# Patient Record
Sex: FEMALE | Race: BLACK OR AFRICAN AMERICAN
[De-identification: names, ages, dates, MRNs, and addresses within clinical notes are randomized per-mention and may not be internally consistent; named-entity substitution may affect disease eponyms.]

---

## 2018-11-06 ENCOUNTER — HOSPITAL ENCOUNTER (INPATIENT)
Dept: HOSPITAL 61 - ER | Age: 53
LOS: 3 days | Discharge: HOME | DRG: 312 | End: 2018-11-09
Attending: INTERNAL MEDICINE | Admitting: INTERNAL MEDICINE
Payer: SELF-PAY

## 2018-11-06 VITALS — BODY MASS INDEX: 37.37 KG/M2 | HEIGHT: 67 IN | WEIGHT: 238.12 LBS

## 2018-11-06 DIAGNOSIS — J45.909: ICD-10-CM

## 2018-11-06 DIAGNOSIS — I10: ICD-10-CM

## 2018-11-06 DIAGNOSIS — S91.331A: ICD-10-CM
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
## 2018-11-06 DIAGNOSIS — F17.210: ICD-10-CM
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
## 2018-11-06 DIAGNOSIS — Z88.8: ICD-10-CM
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
## 2018-11-06 DIAGNOSIS — Z90.710: ICD-10-CM

## 2018-11-06 DIAGNOSIS — E66.01: ICD-10-CM

## 2018-11-06 DIAGNOSIS — R55: Primary | ICD-10-CM

## 2018-11-06 DIAGNOSIS — Z82.49: ICD-10-CM
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
## 2018-11-06 PROCEDURE — 82553 CREATINE MB FRACTION: CPT

## 2018-11-06 PROCEDURE — 70450 CT HEAD/BRAIN W/O DYE: CPT

## 2018-11-06 PROCEDURE — 81001 URINALYSIS AUTO W/SCOPE: CPT

## 2018-11-06 PROCEDURE — 83735 ASSAY OF MAGNESIUM: CPT

## 2018-11-06 PROCEDURE — 72125 CT NECK SPINE W/O DYE: CPT

## 2018-11-06 PROCEDURE — 96360 HYDRATION IV INFUSION INIT: CPT

## 2018-11-06 PROCEDURE — 80307 DRUG TEST PRSMV CHEM ANLYZR: CPT

## 2018-11-06 PROCEDURE — 93880 EXTRACRANIAL BILAT STUDY: CPT

## 2018-11-06 PROCEDURE — 84484 ASSAY OF TROPONIN QUANT: CPT

## 2018-11-06 PROCEDURE — 93005 ELECTROCARDIOGRAM TRACING: CPT

## 2018-11-06 PROCEDURE — 71045 X-RAY EXAM CHEST 1 VIEW: CPT

## 2018-11-06 PROCEDURE — 36415 COLL VENOUS BLD VENIPUNCTURE: CPT

## 2018-11-06 PROCEDURE — 95816 EEG AWAKE AND DROWSY: CPT

## 2018-11-06 PROCEDURE — 80053 COMPREHEN METABOLIC PANEL: CPT

## 2018-11-06 PROCEDURE — 84443 ASSAY THYROID STIM HORMONE: CPT

## 2018-11-06 PROCEDURE — 85025 COMPLETE CBC W/AUTO DIFF WBC: CPT

## 2018-11-06 NOTE — PHYS DOC
Past Medical History


Past Medical History:  Asthma, Hypertension


Past Surgical History:  Hysterectomy


Additional Past Surgical Histo:  bladder surgery


Smokin Pack Per Day


Alcohol Use:  None


Drug Use:  None





Adult General


Chief Complaint


Chief Complaint:  MECHANICAL FALL





HPI


HPI





Patient is a 53  year old female presenting to the ED due to fall via EMS. She 

states that she was on a bus going to the [employer] but fell asleep due to many 

stress factors in her life, then fell off her seat, hitting her head and losing 

consciousness briefly in the process. The  called the ambulance. She 

states that she has a headache and some neck pain. She is from [place] and is in 

[place] trying to work to make money for her wedding. She stays at shelter homes and 

cousin's house. Her speech is pressured and tangential. When asked about her 

mood, she says she feels "betrayed" by her fiance. She denies suicidal and 

homicidal ideations when asked multiple times. She denies chest pain, SOB, 

abdominal pain, leg swelling.





Review of Systems


Review of Systems





Constitutional: Denies fever or chills []


Eyes: Denies change in visual acuity, redness, or eye pain []


HENT: Denies nasal congestion or sore throat []


Respiratory: Denies cough or shortness of breath []


Cardiovascular: Denies chest pain


GI: Denies abdominal pain, nausea, vomiting, or diarrhea []


: Denies dysuria or hematuria []


Musculoskeletal: Reports neck pain. Denies back pain or joint pain []


Integument: Denies rash or skin lesions []


Neurologic: Reports headache; denies focal weakness or sensory changes []





Complete systems were reviewed and found to be within normal limits, except as 

documented in this note.





Current Medications


Current Medications





Current Medications








 Medications


  (Trade)  Dose


 Ordered  Sig/Taj  Start Time


 Stop Time Status Last Admin


Dose Admin


 


 Aspirin


  (Shai Aspirin)  325 mg  1X  ONCE  18 01:15


 18 01:16 DC 18 01:27


325 MG


 


 Ondansetron HCl


  (Zofran)  4 mg  PRN Q8HRS  PRN  18 01:15


 18 01:14   





 


 Sodium Chloride  1,000 ml @ 


 1,000 mls/hr  1X  ONCE  18 23:00


 18 23:59 DC 18 00:46


1,000 MLS/HR











Allergies


Allergies





Allergies








Coded Allergies Type Severity Reaction Last Updated Verified


 


  codeine Allergy Mild  18 Yes











Physical Exam


Physical Exam





Constitutional: Well developed, well nourished, no acute distress, somnolent, 

non-toxic appearance. []


HENT: Normocephalic, atraumatic, bilateral external ears normal, oropharynx 

moist


Eyes: PERRL, EOMI, conjunctiva normal, no discharge. [] 


Neck: Normal range of motion, mild tenderness, supple, no stridor. [] 


Cardiovascular: Heart rate regular rhythm, no murmur []


Lungs & Thorax:  Bilateral breath sounds clear to auscultation []


Abdomen: Soft, no tenderness


Skin: Warm, dry, no erythema, no rash. [] 


Back: No tenderness, no CVA tenderness. [] 


Extremities: No tenderness, ROM intact, no edema. [] 


Neurologic: Alert and oriented X 3, normal motor function, normal sensory 

function, no focal deficits noted. []


Psychologic: Affect normal, judgement normal, mood normal. []





Current Patient Data


Vital Signs





 Vital Signs








  Date Time  Temp Pulse Resp B/P (MAP) Pulse Ox O2 Delivery O2 Flow Rate FiO2


 


18 00:55  80 16  95   


 


18 22:19 98.1   169/90 (116)  Room Air  





 98.1       








Lab Values





 Laboratory Tests








Test


 18


23:57 18


00:20


 


Urine Collection Type Unknown   


 


Urine Color Yellow   


 


Urine Clarity Clear   


 


Urine pH 6.0   


 


Urine Specific Gravity 1.025   


 


Urine Protein


 Negative mg/dL


(NEG-TRACE) 





 


Urine Glucose (UA)


 Negative mg/dL


(NEG) 





 


Urine Ketones (Stick)


 Negative mg/dL


(NEG) 





 


Urine Blood


 Negative (NEG)


 





 


Urine Nitrite


 Negative (NEG)


 





 


Urine Bilirubin


 Negative (NEG)


 





 


Urine Urobilinogen Dipstick


 1.0 mg/dL (0.2


mg/dL) 





 


Urine Leukocyte Esterase


 Negative (NEG)


 





 


Urine RBC 0 /HPF (0-2)   


 


Urine WBC


 Occ /HPF (0-4)


 





 


Urine Squamous Epithelial


Cells Few /LPF  


 





 


Urine Bacteria


 0 /HPF (0-FEW)


 





 


Urine Mucus Mod /LPF   


 


Urine Opiates Screen Neg (NEG)   


 


Urine Methadone Screen Neg (NEG)   


 


Urine Barbiturates Neg (NEG)   


 


Urine Phencyclidine Screen Neg (NEG)   


 


Urine


Amphetamine/Methamphetamine Neg (NEG)  


 





 


Urine Benzodiazepines Screen Neg (NEG)   


 


Urine Cocaine Screen Neg (NEG)   


 


Urine Cannabinoids Screen Neg (NEG)   


 


Urine Ethyl Alcohol Neg (NEG)   


 


White Blood Count


 


 6.9 x10^3/uL


(4.0-11.0)


 


Red Blood Count


 


 4.11 x10^6/uL


(3.50-5.40)


 


Hemoglobin


 


 12.5 g/dL


(12.0-15.5)


 


Hematocrit


 


 37.4 %


(36.0-47.0)


 


Mean Corpuscular Volume


 


 91 fL ()





 


Mean Corpuscular Hemoglobin  30 pg (25-35)  


 


Mean Corpuscular Hemoglobin


Concent 


 33 g/dL


(31-37)


 


Red Cell Distribution Width


 


 14.6 %


(11.5-14.5)  H


 


Platelet Count


 


 257 x10^3/uL


(140-400)


 


Neutrophils (%) (Auto)  56 % (31-73)  


 


Lymphocytes (%) (Auto)  28 % (24-48)  


 


Monocytes (%) (Auto)  8 % (0-9)  


 


Eosinophils (%) (Auto)  8 % (0-3)  H


 


Basophils (%) (Auto)  1 % (0-3)  


 


Neutrophils # (Auto)


 


 3.9 x10^3uL


(1.8-7.7)


 


Lymphocytes # (Auto)


 


 1.9 x10^3/uL


(1.0-4.8)


 


Monocytes # (Auto)


 


 0.5 x10^3/uL


(0.0-1.1)


 


Eosinophils # (Auto)


 


 0.5 x10^3/uL


(0.0-0.7)


 


Basophils # (Auto)


 


 0.0 x10^3/uL


(0.0-0.2)


 


Sodium Level


 


 147 mmol/L


(136-145)  H


 


Potassium Level


 


 3.9 mmol/L


(3.5-5.1)


 


Chloride Level


 


 109 mmol/L


()  H


 


Carbon Dioxide Level


 


 31 mmol/L


(21-32)


 


Anion Gap  7 (6-14)  


 


Blood Urea Nitrogen


 


 17 mg/dL


(7-20)


 


Creatinine


 


 1.0 mg/dL


(0.6-1.0)


 


Estimated GFR


(Cockcroft-Gault) 


 70.5  





 


BUN/Creatinine Ratio  17 (6-20)  


 


Glucose Level


 


 90 mg/dL


(70-99)


 


Calcium Level


 


 9.7 mg/dL


(8.5-10.1)


 


Magnesium Level


 


 2.1 mg/dL


(1.8-2.4)


 


Total Bilirubin


 


 0.3 mg/dL


(0.2-1.0)


 


Aspartate Amino Transferase


(AST) 


 20 U/L (15-37)





 


Alanine Aminotransferase (ALT)


 


 17 U/L (14-59)





 


Alkaline Phosphatase


 


 78 U/L


()


 


Creatine Kinase


 


 193 U/L


()  H


 


Creatine Kinase MB (Mass)


 


 2.5 ng/mL


(0.0-3.6)


 


Creatine Kinase MB Relative


Index 


 1.3 % (0-4)  





 


Troponin I Quantitative


 


 < 0.017 ng/mL


(0.000-0.055)


 


Total Protein


 


 7.6 g/dL


(6.4-8.2)


 


Albumin


 


 3.3 g/dL


(3.4-5.0)  L


 


Albumin/Globulin Ratio


 


 0.8 (1.0-1.7)


L


 


Ethyl Alcohol Level


 


 < 10 mg/dL


(0-10)





 Laboratory Tests


18 00:20








 Laboratory Tests


18 00:20











EKG


EKG


@0050 NSR at 67bpm, NO ST elevation





Radiology/Procedures


Radiology/Procedures


PROCEDURE: CT HEAD AND CERVICAL SPINE WO





PQRS Compliance Statement:


 


One or more of the following individualized dose reduction techniques were


utilized for this examination:  


1. Automated exposure control  


2. Adjustment of the mA and/or kV according to patient size  


3. Use of iterative reconstruction technique


 


 


CT HEAD AND CERVICAL SPINE WITHOUT CONTRAST


 


History: fall, head and neck pain, no priors


 


Comparison: None.


 


Procedure: Axial images are obtained of the head from the skull base 


through the vertex without IV contrast. Noncontrast helical CT of the 


cervical spine was performed.  Axial, sagittal, and coronal 


reconstructions were obtained. 


 


Findings: 


The ventricles and sulci are normal for the patient's age. 


No mass-effect, midline shift, hemorrhage or obvious acute infarction is 


identified.  Basilar cisterns are patent.  


 


Bone windows demonstrate no significant calvarial abnormality. 


Mild mucosal thickening right maxillary left sphenoid sinuses. No 


air-fluid level. Mastoid air cells are well aerated. 


 


There is no evidence of acute fracture or acute malalignment of the 


cervical spine. 


 


The vertebral body height and alignment are maintained. No significant 


disc space narrowing. There is degenerative endplate spurring. No perched 


or jumped facets. Craniovertebral junction is intact. Uncinate process 


hypertrophy.


 


Visualized soft tissues of the neck demonstrate no significant 


abnormalities. Mild opacity in the posterior left lung apex.


 


IMPRESSION:


1.  No acute intracranial abnormality.  


2.  No acute fracture of the cervical spine.


 


Electronically signed by: Luís Pardo MD (2018 12:15 AM) [facility]





Course & Med Decision Making


Course & Med Decision Making


54 yo female presenting to the ED with report of syncopal episode.  Patient 

neurologically intact.  Denies chest pain.  EKG stable.  Labs obtained and 

posted to chart.  Initial troponin WNL.  CXR stable. EKG stabe.  CT head/

cervical spine without acute process. Patient initially with pressured speech 

and subsequently became somnolent but arousable to voice. Given situation 

patient requiring admission for further evaluation and treatment. Discussed 

with Dr. Dougherty (hospitalist) who is in agreement with admission. Discussed 

findings and plan with patient, who acknowledges understanding and agreement.





Dragon Disclaimer


Dragon Disclaimer


This electronic medical record was generated, in whole or in part, using a 

voice recognition dictation system.





Departure


Departure


Impression:  


 Primary Impression:  


 Syncope


Disposition:  09 ADMITTED AS INPATIENT


Admitting Physician:  Ganga Maciel


Condition:  STABLE





Problem Qualifiers








 Primary Impression:  


 Syncope


 Syncope type:  unspecified  Qualified Codes:  R55 - Syncope and collapse








SHAI YARBROUGH DO 2018 23:28

## 2018-11-07 VITALS — DIASTOLIC BLOOD PRESSURE: 96 MMHG | SYSTOLIC BLOOD PRESSURE: 183 MMHG

## 2018-11-07 VITALS — SYSTOLIC BLOOD PRESSURE: 149 MMHG | DIASTOLIC BLOOD PRESSURE: 92 MMHG

## 2018-11-07 VITALS — SYSTOLIC BLOOD PRESSURE: 185 MMHG | DIASTOLIC BLOOD PRESSURE: 98 MMHG

## 2018-11-07 VITALS — DIASTOLIC BLOOD PRESSURE: 94 MMHG | SYSTOLIC BLOOD PRESSURE: 157 MMHG

## 2018-11-07 VITALS — DIASTOLIC BLOOD PRESSURE: 87 MMHG | SYSTOLIC BLOOD PRESSURE: 153 MMHG

## 2018-11-07 VITALS — SYSTOLIC BLOOD PRESSURE: 170 MMHG | DIASTOLIC BLOOD PRESSURE: 98 MMHG

## 2018-11-07 LAB
ALBUMIN SERPL-MCNC: 3.3 G/DL (ref 3.4–5)
ALBUMIN/GLOB SERPL: 0.8 {RATIO} (ref 1–1.7)
ALP SERPL-CCNC: 78 U/L (ref 46–116)
ALT SERPL-CCNC: 17 U/L (ref 14–59)
AMPHETAMINE/METHAMPHETAMINE: (no result)
ANION GAP SERPL CALC-SCNC: 7 MMOL/L (ref 6–14)
APTT PPP: YELLOW S
AST SERPL-CCNC: 20 U/L (ref 15–37)
BACTERIA #/AREA URNS HPF: 0 /HPF
BARBITURATES UR-MCNC: (no result) UG/ML
BASOPHILS # BLD AUTO: 0 X10^3/UL (ref 0–0.2)
BASOPHILS NFR BLD: 1 % (ref 0–3)
BENZODIAZ UR-MCNC: (no result) UG/L
BILIRUB SERPL-MCNC: 0.3 MG/DL (ref 0.2–1)
BILIRUB UR QL STRIP: NEGATIVE
BUN SERPL-MCNC: 17 MG/DL (ref 7–20)
BUN/CREAT SERPL: 17 (ref 6–20)
CALCIUM SERPL-MCNC: 9.7 MG/DL (ref 8.5–10.1)
CANNABINOIDS UR-MCNC: (no result) UG/L
CHLORIDE SERPL-SCNC: 109 MMOL/L (ref 98–107)
CK SERPL-CCNC: 193 U/L (ref 26–192)
CO2 SERPL-SCNC: 31 MMOL/L (ref 21–32)
COCAINE UR-MCNC: (no result) NG/ML
CREAT SERPL-MCNC: 1 MG/DL (ref 0.6–1)
EOSINOPHIL NFR BLD: 0.5 X10^3/UL (ref 0–0.7)
EOSINOPHIL NFR BLD: 8 % (ref 0–3)
ERYTHROCYTE [DISTWIDTH] IN BLOOD BY AUTOMATED COUNT: 14.6 % (ref 11.5–14.5)
FIBRINOGEN PPP-MCNC: CLEAR MG/DL
GFR SERPLBLD BASED ON 1.73 SQ M-ARVRAT: 70.5 ML/MIN
GLOBULIN SER-MCNC: 4.3 G/DL (ref 2.2–3.8)
GLUCOSE SERPL-MCNC: 90 MG/DL (ref 70–99)
HCT VFR BLD CALC: 37.4 % (ref 36–47)
HGB BLD-MCNC: 12.5 G/DL (ref 12–15.5)
LYMPHOCYTES # BLD: 1.9 X10^3/UL (ref 1–4.8)
LYMPHOCYTES NFR BLD AUTO: 28 % (ref 24–48)
MAGNESIUM SERPL-MCNC: 2.1 MG/DL (ref 1.8–2.4)
MCH RBC QN AUTO: 30 PG (ref 25–35)
MCHC RBC AUTO-ENTMCNC: 33 G/DL (ref 31–37)
MCV RBC AUTO: 91 FL (ref 79–100)
METHADONE SERPL-MCNC: (no result) NG/ML
MONO #: 0.5 X10^3/UL (ref 0–1.1)
MONOCYTES NFR BLD: 8 % (ref 0–9)
NEUT #: 3.9 X10^3UL (ref 1.8–7.7)
NEUTROPHILS NFR BLD AUTO: 56 % (ref 31–73)
NITRITE UR QL STRIP: NEGATIVE
OPIATES UR-MCNC: (no result) NG/ML
PCP SERPL-MCNC: (no result) MG/DL
PH UR STRIP: 6 [PH]
PLATELET # BLD AUTO: 257 X10^3/UL (ref 140–400)
POTASSIUM SERPL-SCNC: 3.9 MMOL/L (ref 3.5–5.1)
PROT SERPL-MCNC: 7.6 G/DL (ref 6.4–8.2)
PROT UR STRIP-MCNC: NEGATIVE MG/DL
RBC # BLD AUTO: 4.11 X10^6/UL (ref 3.5–5.4)
RBC #/AREA URNS HPF: 0 /HPF (ref 0–2)
SODIUM SERPL-SCNC: 147 MMOL/L (ref 136–145)
SQUAMOUS #/AREA URNS LPF: (no result) /LPF
UROBILINOGEN UR-MCNC: 1 MG/DL
WBC # BLD AUTO: 6.9 X10^3/UL (ref 4–11)
WBC #/AREA URNS HPF: (no result) /HPF (ref 0–4)

## 2018-11-07 RX ADMIN — BACITRACIN SCH MLS/HR: 5000 INJECTION, POWDER, FOR SOLUTION INTRAMUSCULAR at 15:55

## 2018-11-07 RX ADMIN — ENOXAPARIN SODIUM SCH MG: 40 INJECTION SUBCUTANEOUS at 15:56

## 2018-11-07 RX ADMIN — LISINOPRIL SCH MG: 20 TABLET ORAL at 15:55

## 2018-11-07 NOTE — RAD
CHEST AP ONLY

 

Clinical Indication: shortness of breath

 

Comparison:  None.

 

Findings: 

Cardiac size upper limits of normal. Mild pulmonary vascular congestion 

and cephalization. There is no pneumothorax. No pleural effusion is 

appreciated. No acute bone abnormality.

 

IMPRESSION: 

Cardiac size upper limits of normal. Mild pulmonary vascular congestion. 

Correlate for mild CHF or volume overload.

 

 

Electronically signed by: Luís Pardo MD (11/7/2018 5:47 AM) [facility]

## 2018-11-07 NOTE — PDOC2
NEUROLOGY CONSULT


Date of Admission


Date of Admission


DATE: 11/7/18 


TIME: 18:10





Reason for Consult


Reason for Consult:


IMPRESSION:


Syncopal spell.


Seizure evaluation.


Fall.


HTN.


Emotional stress.


Smoking.


Obesity.





RECOMMENDATIONS/PLAN:


EEG.


Lab: see orders.


Treat medical diseases.


Weight reduction.





HISTORY OF THE PRESENT ILLNESS:


This is a 53 -year-old AA female patient was brought to the ER of [facility] by EMS. 

The patient stated that she was on a bus going to the [facility] but fell asleep 

then fell off her seat, hitting her head. The  called ambulance so 

she was brought here. She stays at shelter homes and cousin's house. When asked 

about her mood, she says she feels "betrayed" by her fiance. She denies 

suicidal and homicidal ideations. She stated she had a lot of emotional stress.





Past Medical History


Asthma, Hypertension, a seizure 15 yrs ago when pregnant


Pulmonary:  Bronchitis





Past Surgical History


Hysterectomy, bladder surgery





Family History


HLD, Hypertension. 





Allergies


Coded Allergies:  


codeine (Verified  Allergy, Mild, 11/6/18)





MEDICATIONS:


Refer to [facility]





SOCIAL HISTORY: 


Lives with her cousin. alone. 


Denies illicit drug use. 


She smokes 1  pack of cigarettes a day for years.  


She drinks occasionally.





REVIEW OF SYSTEMS:


Constitutional: Obesity.


Head: No traumatic brain or head injury.


Skin: No edema, or rash.


Ear: No infection.


Eyes: No vision loss or color blindness.


Nose: No bleeding or purulent discharges.


Hearing: No hearing decrease.


Neck: No injury.


Breast: No history of cancer, masses,or discharges.


Cardiac: HTN.


Pulmonary: Asthma.


GI: No GI ulcer, GI bleeding.


Urinary/genital: UTI.


Endocrinologic: Obesity.


Skeletomuscular: No muscular atrophy, deformity.


Neurological: see  HP.


Psychiatric: Denies drug use/abuse.


Otherwise, not pertinant14-point review of systems.





PHYSICAL EXAMINATION:   


General appearance is in no obvious acute distress.  


HEENT:  Normocephalic and nontraumatic.  Eyes, nose, ears, and throat are 

unremarkable.  


Neck is supple. No lymphadenopathy. No bruits are heard over the carotid 

artery.  No crepitus. 


Cardiovascular:  S1, S2, regular rate and rhythm.  


Pulmonary:  Clear to auscultation bilaterally. 


Abdomen:  Bowel sounds are positive.  Abdomen is soft, nontender, and 

nondistended.    


Extremities:  No rash, lesions, or edema.  


No restriction of range of motion





NEUROLOGICAL  EXAMINATION:


Alert 


Oriented to time, place and person.


PERRL.


EOMI.


CN: no focal findings.


Muscle tone: within normal.


Muscle strength: 5


DTR: 2


Plantar reflex: Flexor response bilaterally 


Gait: not examined in bed.


Sensory exam: no abnormal findings.


No cerebellar signs elicited.


F-T-N test accurate.





Current Medications


Current Medications





Current Medications


Sodium Chloride 1,000 ml @  1,000 mls/hr 1X  ONCE IV  Last administered on 11/7/ 18at 00:46;  Start 11/6/18 at 23:00;  Stop 11/6/18 at 23:59;  Status DC


Aspirin (Shai Aspirin) 325 mg 1X  ONCE PO  Last administered on 11/7/18at 01:27

;  Start 11/7/18 at 01:15;  Stop 11/7/18 at 01:16;  Status DC


Ondansetron HCl (Zofran) 4 mg PRN Q8HRS  PRN IV NAUSEA/VOMITING;  Start 11/7/18 

at 01:15;  Stop 11/8/18 at 01:14


Lisinopril (Prinivil) 20 mg DAILY PO  Last administered on 11/7/18at 15:55;  

Start 11/7/18 at 14:00


Enoxaparin Sodium (Lovenox 40mg Syringe) 40 mg Q24H SQ  Last administered on 11/ 7/18at 15:56;  Start 11/7/18 at 14:00


Sodium Chloride 1,000 ml @  75 mls/hr [id number] IV  Last administered on 11/7/ 18at 15:55;  Start 11/7/18 at 13:45





Active Scripts


Active


Reported


Lisinopril 20 Mg Tablet 1 Tab PO DAILY





Allergies


Allergies:





Allergies








Coded Allergies Type Severity Reaction Last Updated Verified


 


  codeine Allergy Mild  11/6/18 Yes











ROS


Review of System


The patient denies any associated fevers, chills, headache, ear pain, rhinorrhea

, sore throat, stiff neck, productive cough, chest pain, shortness of breath, 

back or flank pain, abdominal pain, nausea, vomiting, diarrhea, constipation, 

dysuria, rash, numbness, weakness, tingling, incontinence, difficulty  

ambulating, or diaphoresis.





Physical Exam


Physical Exam


General: Well developed, well nourished, no acute distress, well appearing


HEENT:  Pupils equally round and reactive to light, EOMI, no discharge, normal 

conjunctiva


Neck:  Supple, no nuchal rigidity, no JVD, trachea midline, no tenderness


Cardiac:  RRR, no murmurs, no gallops, no rubs


Chest/Lungs:  CTAB, no wheeze, no rhonchi, no crackles


Abdomen: soft, non-distended, no guarding, no peritoneal signs, non-tender 


Back:  No tenderness


Extremities:  no edema, pulses intact, non-tender,capillary refill <3 sec 

bilateral upper and lower extremities,


Neuro:  Alert and oriented x 4, no focal deficits, normal speech





Vitals


Vitals:





Vital Signs








  Date Time  Temp Pulse Resp B/P (MAP) Pulse Ox O2 Delivery O2 Flow Rate FiO2


 


11/7/18 15:55  79  183/96    


 


11/7/18 15:00 97.7  20  95 Room Air  





 97.7       











Labs


Labs





Laboratory Tests








Test


 11/6/18


23:57 11/7/18


00:20 11/7/18


04:00 11/7/18


07:25


 


Urine Collection Type Unknown    


 


Urine Color Yellow    


 


Urine Clarity Clear    


 


Urine pH 6.0    


 


Urine Specific Gravity 1.025    


 


Urine Protein


 Negative mg/dL


(NEG-TRACE) 


 


 





 


Urine Glucose (UA)


 Negative mg/dL


(NEG) 


 


 





 


Urine Ketones (Stick)


 Negative mg/dL


(NEG) 


 


 





 


Urine Blood Negative (NEG)    


 


Urine Nitrite Negative (NEG)    


 


Urine Bilirubin Negative (NEG)    


 


Urine Urobilinogen Dipstick


 1.0 mg/dL (0.2


mg/dL) 


 


 





 


Urine Leukocyte Esterase Negative (NEG)    


 


Urine RBC 0 /HPF (0-2)    


 


Urine WBC Occ /HPF (0-4)    


 


Urine Squamous Epithelial


Cells Few /LPF 


 


 


 





 


Urine Bacteria 0 /HPF (0-FEW)    


 


Urine Mucus Mod /LPF    


 


Urine Opiates Screen Neg (NEG)    


 


Urine Methadone Screen Neg (NEG)    


 


Urine Barbiturates Neg (NEG)    


 


Urine Phencyclidine Screen Neg (NEG)    


 


Urine


Amphetamine/Methamphetamine Neg (NEG) 


 


 


 





 


Urine Benzodiazepines Screen Neg (NEG)    


 


Urine Cocaine Screen Neg (NEG)    


 


Urine Cannabinoids Screen Neg (NEG)    


 


Urine Ethyl Alcohol Neg (NEG)    


 


White Blood Count


 


 6.9 x10^3/uL


(4.0-11.0) 


 





 


Red Blood Count


 


 4.11 x10^6/uL


(3.50-5.40) 


 





 


Hemoglobin


 


 12.5 g/dL


(12.0-15.5) 


 





 


Hematocrit


 


 37.4 %


(36.0-47.0) 


 





 


Mean Corpuscular Volume  91 fL ()   


 


Mean Corpuscular Hemoglobin  30 pg (25-35)   


 


Mean Corpuscular Hemoglobin


Concent 


 33 g/dL


(31-37) 


 





 


Red Cell Distribution Width


 


 14.6 %


(11.5-14.5) 


 





 


Platelet Count


 


 257 x10^3/uL


(140-400) 


 





 


Neutrophils (%) (Auto)  56 % (31-73)   


 


Lymphocytes (%) (Auto)  28 % (24-48)   


 


Monocytes (%) (Auto)  8 % (0-9)   


 


Eosinophils (%) (Auto)  8 % (0-3)   


 


Basophils (%) (Auto)  1 % (0-3)   


 


Neutrophils # (Auto)


 


 3.9 x10^3uL


(1.8-7.7) 


 





 


Lymphocytes # (Auto)


 


 1.9 x10^3/uL


(1.0-4.8) 


 





 


Monocytes # (Auto)


 


 0.5 x10^3/uL


(0.0-1.1) 


 





 


Eosinophils # (Auto)


 


 0.5 x10^3/uL


(0.0-0.7) 


 





 


Basophils # (Auto)


 


 0.0 x10^3/uL


(0.0-0.2) 


 





 


Sodium Level


 


 147 mmol/L


(136-145) 


 





 


Potassium Level


 


 3.9 mmol/L


(3.5-5.1) 


 





 


Chloride Level


 


 109 mmol/L


() 


 





 


Carbon Dioxide Level


 


 31 mmol/L


(21-32) 


 





 


Anion Gap  7 (6-14)   


 


Blood Urea Nitrogen


 


 17 mg/dL


(7-20) 


 





 


Creatinine


 


 1.0 mg/dL


(0.6-1.0) 


 





 


Estimated GFR


(Cockcroft-Gault) 


 70.5 


 


 





 


BUN/Creatinine Ratio  17 (6-20)   


 


Glucose Level


 


 90 mg/dL


(70-99) 


 





 


Calcium Level


 


 9.7 mg/dL


(8.5-10.1) 


 





 


Magnesium Level


 


 2.1 mg/dL


(1.8-2.4) 


 





 


Total Bilirubin


 


 0.3 mg/dL


(0.2-1.0) 


 





 


Aspartate Amino Transf


(AST/SGOT) 


 20 U/L (15-37) 


 


 





 


Alanine Aminotransferase


(ALT/SGPT) 


 17 U/L (14-59) 


 


 





 


Alkaline Phosphatase


 


 78 U/L


() 


 





 


Creatine Kinase


 


 193 U/L


() 


 





 


Creatine Kinase MB (Mass)


 


 2.5 ng/mL


(0.0-3.6) 


 





 


Creatine Kinase MB Relative


Index 


 1.3 % (0-4) 


 


 





 


Troponin I Quantitative


 


 < 0.017 ng/mL


(0.000-0.055) < 0.017 ng/mL


(0.000-0.055) < 0.017 ng/mL


(0.000-0.055)


 


Total Protein


 


 7.6 g/dL


(6.4-8.2) 


 





 


Albumin


 


 3.3 g/dL


(3.4-5.0) 


 





 


Albumin/Globulin Ratio  0.8 (1.0-1.7)   


 


Ethyl Alcohol Level


 


 < 10 mg/dL


(0-10) 


 





 


Thyroid Stimulating Hormone


(TSH) 


 


 


 1.179 uIU/mL


(0.358-3.74)








Laboratory Tests








Test


 11/6/18


23:57 11/7/18


00:20 11/7/18


04:00 11/7/18


07:25


 


Urine Collection Type Unknown    


 


Urine Color Yellow    


 


Urine Clarity Clear    


 


Urine pH 6.0    


 


Urine Specific Gravity 1.025    


 


Urine Protein


 Negative mg/dL


(NEG-TRACE) 


 


 





 


Urine Glucose (UA)


 Negative mg/dL


(NEG) 


 


 





 


Urine Ketones (Stick)


 Negative mg/dL


(NEG) 


 


 





 


Urine Blood Negative (NEG)    


 


Urine Nitrite Negative (NEG)    


 


Urine Bilirubin Negative (NEG)    


 


Urine Urobilinogen Dipstick


 1.0 mg/dL (0.2


mg/dL) 


 


 





 


Urine Leukocyte Esterase Negative (NEG)    


 


Urine RBC 0 /HPF (0-2)    


 


Urine WBC Occ /HPF (0-4)    


 


Urine Squamous Epithelial


Cells Few /LPF 


 


 


 





 


Urine Bacteria 0 /HPF (0-FEW)    


 


Urine Mucus Mod /LPF    


 


Urine Opiates Screen Neg (NEG)    


 


Urine Methadone Screen Neg (NEG)    


 


Urine Barbiturates Neg (NEG)    


 


Urine Phencyclidine Screen Neg (NEG)    


 


Urine


Amphetamine/Methamphetamine Neg (NEG) 


 


 


 





 


Urine Benzodiazepines Screen Neg (NEG)    


 


Urine Cocaine Screen Neg (NEG)    


 


Urine Cannabinoids Screen Neg (NEG)    


 


Urine Ethyl Alcohol Neg (NEG)    


 


White Blood Count


 


 6.9 x10^3/uL


(4.0-11.0) 


 





 


Red Blood Count


 


 4.11 x10^6/uL


(3.50-5.40) 


 





 


Hemoglobin


 


 12.5 g/dL


(12.0-15.5) 


 





 


Hematocrit


 


 37.4 %


(36.0-47.0) 


 





 


Mean Corpuscular Volume  91 fL ()   


 


Mean Corpuscular Hemoglobin  30 pg (25-35)   


 


Mean Corpuscular Hemoglobin


Concent 


 33 g/dL


(31-37) 


 





 


Red Cell Distribution Width


 


 14.6 %


(11.5-14.5) 


 





 


Platelet Count


 


 257 x10^3/uL


(140-400) 


 





 


Neutrophils (%) (Auto)  56 % (31-73)   


 


Lymphocytes (%) (Auto)  28 % (24-48)   


 


Monocytes (%) (Auto)  8 % (0-9)   


 


Eosinophils (%) (Auto)  8 % (0-3)   


 


Basophils (%) (Auto)  1 % (0-3)   


 


Neutrophils # (Auto)


 


 3.9 x10^3uL


(1.8-7.7) 


 





 


Lymphocytes # (Auto)


 


 1.9 x10^3/uL


(1.0-4.8) 


 





 


Monocytes # (Auto)


 


 0.5 x10^3/uL


(0.0-1.1) 


 





 


Eosinophils # (Auto)


 


 0.5 x10^3/uL


(0.0-0.7) 


 





 


Basophils # (Auto)


 


 0.0 x10^3/uL


(0.0-0.2) 


 





 


Sodium Level


 


 147 mmol/L


(136-145) 


 





 


Potassium Level


 


 3.9 mmol/L


(3.5-5.1) 


 





 


Chloride Level


 


 109 mmol/L


() 


 





 


Carbon Dioxide Level


 


 31 mmol/L


(21-32) 


 





 


Anion Gap  7 (6-14)   


 


Blood Urea Nitrogen


 


 17 mg/dL


(7-20) 


 





 


Creatinine


 


 1.0 mg/dL


(0.6-1.0) 


 





 


Estimated GFR


(Cockcroft-Gault) 


 70.5 


 


 





 


BUN/Creatinine Ratio  17 (6-20)   


 


Glucose Level


 


 90 mg/dL


(70-99) 


 





 


Calcium Level


 


 9.7 mg/dL


(8.5-10.1) 


 





 


Magnesium Level


 


 2.1 mg/dL


(1.8-2.4) 


 





 


Total Bilirubin


 


 0.3 mg/dL


(0.2-1.0) 


 





 


Aspartate Amino Transf


(AST/SGOT) 


 20 U/L (15-37) 


 


 





 


Alanine Aminotransferase


(ALT/SGPT) 


 17 U/L (14-59) 


 


 





 


Alkaline Phosphatase


 


 78 U/L


() 


 





 


Creatine Kinase


 


 193 U/L


() 


 





 


Creatine Kinase MB (Mass)


 


 2.5 ng/mL


(0.0-3.6) 


 





 


Creatine Kinase MB Relative


Index 


 1.3 % (0-4) 


 


 





 


Troponin I Quantitative


 


 < 0.017 ng/mL


(0.000-0.055) < 0.017 ng/mL


(0.000-0.055) < 0.017 ng/mL


(0.000-0.055)


 


Total Protein


 


 7.6 g/dL


(6.4-8.2) 


 





 


Albumin


 


 3.3 g/dL


(3.4-5.0) 


 





 


Albumin/Globulin Ratio  0.8 (1.0-1.7)   


 


Ethyl Alcohol Level


 


 < 10 mg/dL


(0-10) 


 





 


Thyroid Stimulating Hormone


(TSH) 


 


 


 1.179 uIU/mL


(0.358-3.74)

















SOHAIL BYRNE MD Nov 7, 2018 18:21

## 2018-11-07 NOTE — PDOC1
History and Physical


Date of Admission


Date of Admission


DATE: 18 


TIME: 10:57





Identification/Chief Complaint


Chief Complaint


 seen in ER 53  year old female presented  to the ED due to fall via EMS. 





she was on a bus going to the [employer] but fell asleep due to many stress factors 

in her life, then fell off her seat, hitting her head and losing consciousness 

briefly 





 The  called the ambulance. She states that she has a headache and 

some neck pain. 





She is from [place] and is in [place] trying to work to make money for her wedding. 





She stays at shelter homes and cousin's house. Her speech is pressured and 

tangential. 





she played the violin in high school, very talented, but had behavioral issues, 

she reports





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 

agencies





Past Medical History


Past Medical History:  Asthma, Hypertension


Past Surgical History:  Hysterectomy


Additional Past Surgical Histo:  bladder surgery


Smokin Pack Per Day


Alcohol Use:  None


Drug Use:  None





had a seizure 15 yrs ago when pregnant





family hx obesity


Pulmonary:  Bronchitis





Past Surgical History


Past Surgical History:  No pertinent history





Family History


Family History:  High Cholestrol, Hypertension





Social History


Smoke:  <1 pack per day


ALCOHOL:  occassional


Drugs:  None





Current Problem List


Problem List


Problems


Medical Problems:


(1) Syncope


Status: Acute  











Current Medications


Current Medications





Current Medications


Sodium Chloride 1,000 ml @  1,000 mls/hr 1X  ONCE IV  Last administered on at 00:46;  Start 18 at 23:00;  Stop 18 at 23:59;  Status DC


Aspirin (Shai Aspirin) 325 mg 1X  ONCE PO  Last administered on 18at 01:27

;  Start 18 at 01:15;  Stop 18 at 01:16;  Status DC


Ondansetron HCl (Zofran) 4 mg PRN Q8HRS  PRN IV NAUSEA/VOMITING;  Start 18 

at 01:15;  Stop 18 at 01:14





Active Scripts


Active


Reported


Lisinopril 20 Mg Tablet 1 Tab PO DAILY





Allergies


Allergies:  


Coded Allergies:  


     codeine (Verified  Allergy, Mild, 18)





ROS


Review of System





Review of Systems


Review of Systems





Constitutional: Denies fever or chills []


Eyes: Denies change in visual acuity, redness, or eye pain []


HENT: Denies nasal congestion or sore throat []


Respiratory: Denies cough or shortness of breath []


Cardiovascular: Denies chest pain


GI: Denies abdominal pain, nausea, vomiting, or diarrhea []


: Denies dysuria or hematuria []


Musculoskeletal: Reports neck pain. Denies back pain or joint pain []


Integument: Denies rash or skin lesions []


Neurologic: Reports headache; denies focal weakness or sensory changes []





14 pt systems  were reviewed and found to be within normal limits, except as 

documented


General:  YES: Fatigue


PSYCHOLOGICAL ROS:  YES: Concentration difficultie





Physical Exam





Constitutional: Well developed, well nourished, no acute distress, somnolent, 

non-toxic appearance. []


HENT: Normocephalic, atraumatic, bilateral external ears normal, oropharynx 

moist


Eyes: PERRL, EOMI, conjunctiva normal, no discharge. [] 


Neck: Normal range of motion, mild tenderness, supple, no stridor. [] 


Cardiovascular: Heart rate regular rhythm, no murmur []


Lungs & Thorax:  Bilateral breath sounds clear to auscultation []


Abdomen: Soft, no tenderness


Skin: Warm, dry, no erythema, no rash. [] 


Back: No tenderness, no CVA tenderness. [] 


Extremities: No tenderness, ROM intact, no edema. [] 


Neurologic: Alert and oriented X 3, normal motor function, normal sensory 

function, no focal deficits noted. []


Psychologic: Affect normal, judgement normal, mood normal. []


General:  Alert, Oriented X3, Cooperative


HEENT:  EOMI


Lungs:  Clear to auscultation


Heart:  RRR, no thrills


Breasts:  Not examined


Abdomen:  Soft


Rectal Exam:  not examined


Extremities:  No cyanosis


Neuro:  Cranial nerves 3-12 NL





Vitals


Vitals





Vital Signs








  Date Time  Temp Pulse Resp B/P (MAP) Pulse Ox O2 Delivery O2 Flow Rate FiO2


 


18 08:00      Room Air  


 


18 07:00 98.1 67 20 153/87 (109) 96   





 98.1       











Labs


Labs





Laboratory Tests








Test


 18


23:57 18


00:20 18


04:00 18


07:25


 


Urine Collection Type Unknown    


 


Urine Color Yellow    


 


Urine Clarity Clear    


 


Urine pH 6.0    


 


Urine Specific Gravity 1.025    


 


Urine Protein


 Negative mg/dL


(NEG-TRACE) 


 


 





 


Urine Glucose (UA)


 Negative mg/dL


(NEG) 


 


 





 


Urine Ketones (Stick)


 Negative mg/dL


(NEG) 


 


 





 


Urine Blood Negative (NEG)    


 


Urine Nitrite Negative (NEG)    


 


Urine Bilirubin Negative (NEG)    


 


Urine Urobilinogen Dipstick


 1.0 mg/dL (0.2


mg/dL) 


 


 





 


Urine Leukocyte Esterase Negative (NEG)    


 


Urine RBC 0 /HPF (0-2)    


 


Urine WBC Occ /HPF (0-4)    


 


Urine Squamous Epithelial


Cells Few /LPF 


 


 


 





 


Urine Bacteria 0 /HPF (0-FEW)    


 


Urine Mucus Mod /LPF    


 


Urine Opiates Screen Neg (NEG)    


 


Urine Methadone Screen Neg (NEG)    


 


Urine Barbiturates Neg (NEG)    


 


Urine Phencyclidine Screen Neg (NEG)    


 


Urine


Amphetamine/Methamphetamine Neg (NEG) 


 


 


 





 


Urine Benzodiazepines Screen Neg (NEG)    


 


Urine Cocaine Screen Neg (NEG)    


 


Urine Cannabinoids Screen Neg (NEG)    


 


Urine Ethyl Alcohol Neg (NEG)    


 


White Blood Count


 


 6.9 x10^3/uL


(4.0-11.0) 


 





 


Red Blood Count


 


 4.11 x10^6/uL


(3.50-5.40) 


 





 


Hemoglobin


 


 12.5 g/dL


(12.0-15.5) 


 





 


Hematocrit


 


 37.4 %


(36.0-47.0) 


 





 


Mean Corpuscular Volume  91 fL ()   


 


Mean Corpuscular Hemoglobin  30 pg (25-35)   


 


Mean Corpuscular Hemoglobin


Concent 


 33 g/dL


(31-37) 


 





 


Red Cell Distribution Width


 


 14.6 %


(11.5-14.5) 


 





 


Platelet Count


 


 257 x10^3/uL


(140-400) 


 





 


Neutrophils (%) (Auto)  56 % (31-73)   


 


Lymphocytes (%) (Auto)  28 % (24-48)   


 


Monocytes (%) (Auto)  8 % (0-9)   


 


Eosinophils (%) (Auto)  8 % (0-3)   


 


Basophils (%) (Auto)  1 % (0-3)   


 


Neutrophils # (Auto)


 


 3.9 x10^3uL


(1.8-7.7) 


 





 


Lymphocytes # (Auto)


 


 1.9 x10^3/uL


(1.0-4.8) 


 





 


Monocytes # (Auto)


 


 0.5 x10^3/uL


(0.0-1.1) 


 





 


Eosinophils # (Auto)


 


 0.5 x10^3/uL


(0.0-0.7) 


 





 


Basophils # (Auto)


 


 0.0 x10^3/uL


(0.0-0.2) 


 





 


Sodium Level


 


 147 mmol/L


(136-145) 


 





 


Potassium Level


 


 3.9 mmol/L


(3.5-5.1) 


 





 


Chloride Level


 


 109 mmol/L


() 


 





 


Carbon Dioxide Level


 


 31 mmol/L


(21-32) 


 





 


Anion Gap  7 (6-14)   


 


Blood Urea Nitrogen


 


 17 mg/dL


(7-20) 


 





 


Creatinine


 


 1.0 mg/dL


(0.6-1.0) 


 





 


Estimated GFR


(Cockcroft-Gault) 


 70.5 


 


 





 


BUN/Creatinine Ratio  17 (6-20)   


 


Glucose Level


 


 90 mg/dL


(70-99) 


 





 


Calcium Level


 


 9.7 mg/dL


(8.5-10.1) 


 





 


Magnesium Level


 


 2.1 mg/dL


(1.8-2.4) 


 





 


Total Bilirubin


 


 0.3 mg/dL


(0.2-1.0) 


 





 


Aspartate Amino Transf


(AST/SGOT) 


 20 U/L (15-37) 


 


 





 


Alanine Aminotransferase


(ALT/SGPT) 


 17 U/L (14-59) 


 


 





 


Alkaline Phosphatase


 


 78 U/L


() 


 





 


Creatine Kinase


 


 193 U/L


() 


 





 


Creatine Kinase MB (Mass)


 


 2.5 ng/mL


(0.0-3.6) 


 





 


Creatine Kinase MB Relative


Index 


 1.3 % (0-4) 


 


 





 


Troponin I Quantitative


 


 < 0.017 ng/mL


(0.000-0.055) < 0.017 ng/mL


(0.000-0.055) < 0.017 ng/mL


(0.000-0.055)


 


Total Protein


 


 7.6 g/dL


(6.4-8.2) 


 





 


Albumin


 


 3.3 g/dL


(3.4-5.0) 


 





 


Albumin/Globulin Ratio  0.8 (1.0-1.7)   


 


Ethyl Alcohol Level


 


 < 10 mg/dL


(0-10) 


 











Laboratory Tests








Test


 18


23:57 18


00:20 18


04:00 18


07:25


 


Urine Collection Type Unknown    


 


Urine Color Yellow    


 


Urine Clarity Clear    


 


Urine pH 6.0    


 


Urine Specific Gravity 1.025    


 


Urine Protein


 Negative mg/dL


(NEG-TRACE) 


 


 





 


Urine Glucose (UA)


 Negative mg/dL


(NEG) 


 


 





 


Urine Ketones (Stick)


 Negative mg/dL


(NEG) 


 


 





 


Urine Blood Negative (NEG)    


 


Urine Nitrite Negative (NEG)    


 


Urine Bilirubin Negative (NEG)    


 


Urine Urobilinogen Dipstick


 1.0 mg/dL (0.2


mg/dL) 


 


 





 


Urine Leukocyte Esterase Negative (NEG)    


 


Urine RBC 0 /HPF (0-2)    


 


Urine WBC Occ /HPF (0-4)    


 


Urine Squamous Epithelial


Cells Few /LPF 


 


 


 





 


Urine Bacteria 0 /HPF (0-FEW)    


 


Urine Mucus Mod /LPF    


 


Urine Opiates Screen Neg (NEG)    


 


Urine Methadone Screen Neg (NEG)    


 


Urine Barbiturates Neg (NEG)    


 


Urine Phencyclidine Screen Neg (NEG)    


 


Urine


Amphetamine/Methamphetamine Neg (NEG) 


 


 


 





 


Urine Benzodiazepines Screen Neg (NEG)    


 


Urine Cocaine Screen Neg (NEG)    


 


Urine Cannabinoids Screen Neg (NEG)    


 


Urine Ethyl Alcohol Neg (NEG)    


 


White Blood Count


 


 6.9 x10^3/uL


(4.0-11.0) 


 





 


Red Blood Count


 


 4.11 x10^6/uL


(3.50-5.40) 


 





 


Hemoglobin


 


 12.5 g/dL


(12.0-15.5) 


 





 


Hematocrit


 


 37.4 %


(36.0-47.0) 


 





 


Mean Corpuscular Volume  91 fL ()   


 


Mean Corpuscular Hemoglobin  30 pg (25-35)   


 


Mean Corpuscular Hemoglobin


Concent 


 33 g/dL


(31-37) 


 





 


Red Cell Distribution Width


 


 14.6 %


(11.5-14.5) 


 





 


Platelet Count


 


 257 x10^3/uL


(140-400) 


 





 


Neutrophils (%) (Auto)  56 % (31-73)   


 


Lymphocytes (%) (Auto)  28 % (24-48)   


 


Monocytes (%) (Auto)  8 % (0-9)   


 


Eosinophils (%) (Auto)  8 % (0-3)   


 


Basophils (%) (Auto)  1 % (0-3)   


 


Neutrophils # (Auto)


 


 3.9 x10^3uL


(1.8-7.7) 


 





 


Lymphocytes # (Auto)


 


 1.9 x10^3/uL


(1.0-4.8) 


 





 


Monocytes # (Auto)


 


 0.5 x10^3/uL


(0.0-1.1) 


 





 


Eosinophils # (Auto)


 


 0.5 x10^3/uL


(0.0-0.7) 


 





 


Basophils # (Auto)


 


 0.0 x10^3/uL


(0.0-0.2) 


 





 


Sodium Level


 


 147 mmol/L


(136-145) 


 





 


Potassium Level


 


 3.9 mmol/L


(3.5-5.1) 


 





 


Chloride Level


 


 109 mmol/L


() 


 





 


Carbon Dioxide Level


 


 31 mmol/L


(21-32) 


 





 


Anion Gap  7 (6-14)   


 


Blood Urea Nitrogen


 


 17 mg/dL


(7-20) 


 





 


Creatinine


 


 1.0 mg/dL


(0.6-1.0) 


 





 


Estimated GFR


(Cockcroft-Gault) 


 70.5 


 


 





 


BUN/Creatinine Ratio  17 (6-20)   


 


Glucose Level


 


 90 mg/dL


(70-99) 


 





 


Calcium Level


 


 9.7 mg/dL


(8.5-10.1) 


 





 


Magnesium Level


 


 2.1 mg/dL


(1.8-2.4) 


 





 


Total Bilirubin


 


 0.3 mg/dL


(0.2-1.0) 


 





 


Aspartate Amino Transf


(AST/SGOT) 


 20 U/L (15-37) 


 


 





 


Alanine Aminotransferase


(ALT/SGPT) 


 17 U/L (14-59) 


 


 





 


Alkaline Phosphatase


 


 78 U/L


() 


 





 


Creatine Kinase


 


 193 U/L


() 


 





 


Creatine Kinase MB (Mass)


 


 2.5 ng/mL


(0.0-3.6) 


 





 


Creatine Kinase MB Relative


Index 


 1.3 % (0-4) 


 


 





 


Troponin I Quantitative


 


 < 0.017 ng/mL


(0.000-0.055) < 0.017 ng/mL


(0.000-0.055) < 0.017 ng/mL


(0.000-0.055)


 


Total Protein


 


 7.6 g/dL


(6.4-8.2) 


 





 


Albumin


 


 3.3 g/dL


(3.4-5.0) 


 





 


Albumin/Globulin Ratio  0.8 (1.0-1.7)   


 


Ethyl Alcohol Level


 


 < 10 mg/dL


(0-10) 


 














Images


Images


PROCEDURE: CT HEAD AND CERVICAL SPINE WO





PQRS Compliance Statement:


 


One or more of the following individualized dose reduction techniques were


utilized for this examination:  


1. Automated exposure control  


2. Adjustment of the mA and/or kV according to patient size  


3. Use of iterative reconstruction technique


 


 


CT HEAD AND CERVICAL SPINE WITHOUT CONTRAST


 


History: fall, head and neck pain, no priors


 


Comparison: None.


 


Procedure: Axial images are obtained of the head from the skull base 


through the vertex without IV contrast. Noncontrast helical CT of the 


cervical spine was performed.  Axial, sagittal, and coronal 


reconstructions were obtained. 


 


Findings: 


The ventricles and sulci are normal for the patient's age. 


No mass-effect, midline shift, hemorrhage or obvious acute infarction is 


identified.  Basilar cisterns are patent.  


 


Bone windows demonstrate no significant calvarial abnormality. 


Mild mucosal thickening right maxillary left sphenoid sinuses. No 


air-fluid level. Mastoid air cells are well aerated. 


 


There is no evidence of acute fracture or acute malalignment of the 


cervical spine. 


 


The vertebral body height and alignment are maintained. No significant 


disc space narrowing. There is degenerative endplate spurring. No perched 


or jumped facets. Craniovertebral junction is intact. Uncinate process 


hypertrophy.


 


Visualized soft tissues of the neck demonstrate no significant 


abnormalities. Mild opacity in the posterior left lung apex.


 


IMPRESSION:


1.  No acute intracranial abnormality.  


2.  No acute fracture of the cervical spine.


 


Electronically signed by: Luís Pardo MD (2018 12:15 AM) [facility]





VTE Prophylaxis Ordered


VTE Prophylaxis Devices:  Yes


VTE Pharmacological Prophylaxi:  Yes





Assessment/Plan


Assessment/Plan


           Impression:  


            possible seizure, reports a seizure 15 yrs ago when pregnant


 Syncope


 mechanical fall


 tobacco abuse


 morbid obesity


 no acute intracranial abnormality.  


            No acute fracture of the cervical spine.


            hx hypertension


            mild hypernatremia, volume depleted


            possible bipolar


 


 


 plan


 


 SEIZURE PRECAUTIONS


 EEG


 iv fluids


 follow lytes


 doppler carotids


 tele


 neurochecks q 4 hrs


 neurology consult


 ct head











GHULAM MOYER MD 2018 10:57

## 2018-11-07 NOTE — RAD
PQRS Compliance Statement:

 

One or more of the following individualized dose reduction techniques were

utilized for this examination:  

1. Automated exposure control  

2. Adjustment of the mA and/or kV according to patient size  

3. Use of iterative reconstruction technique

 

 

CT HEAD AND CERVICAL SPINE WITHOUT CONTRAST

 

History: fall, head and neck pain, no priors

 

Comparison: None.

 

Procedure: Axial images are obtained of the head from the skull base 

through the vertex without IV contrast. Noncontrast helical CT of the 

cervical spine was performed.  Axial, sagittal, and coronal 

reconstructions were obtained. 

 

Findings: 

The ventricles and sulci are normal for the patient's age. 

No mass-effect, midline shift, hemorrhage or obvious acute infarction is 

identified.  Basilar cisterns are patent.  

 

Bone windows demonstrate no significant calvarial abnormality. 

Mild mucosal thickening right maxillary left sphenoid sinuses. No 

air-fluid level. Mastoid air cells are well aerated. 

 

There is no evidence of acute fracture or acute malalignment of the 

cervical spine. 

 

The vertebral body height and alignment are maintained. No significant 

disc space narrowing. There is degenerative endplate spurring. No perched 

or jumped facets. Craniovertebral junction is intact. Uncinate process 

hypertrophy.

 

Visualized soft tissues of the neck demonstrate no significant 

abnormalities. Mild opacity in the posterior left lung apex.

 

IMPRESSION:

1.  No acute intracranial abnormality.  

2.  No acute fracture of the cervical spine.

 

Electronically signed by: Luís Pardo MD (11/7/2018 12:15 AM) [facility]

## 2018-11-07 NOTE — EKG
[facility]

              [address] [place] [address]

Test Date:    2018               Test Time:    00:05:50

Pat Name:     JORDYN WILSON            Department:   

Patient ID:   [id number]           Room:         4 1

Gender:       F                        Technician:   

:          [id number]               Requested By: SHAI YARBROUGH

Order Number: [id number]           Reading MD:   Marcos Bass MD

                                 Measurements

Intervals                              Axis          

Rate:         67                       P:            54

OR:           140                      QRS:          22

QRSD:         82                       T:            37

QT:           368                                    

QTc:          391                                    

                           Interpretive Statements

SINUS ARRHYTHMIA



Electronically Signed On 2018 17:53:55 CST by Marcos Bass MD

## 2018-11-08 VITALS — DIASTOLIC BLOOD PRESSURE: 94 MMHG | SYSTOLIC BLOOD PRESSURE: 155 MMHG

## 2018-11-08 VITALS — DIASTOLIC BLOOD PRESSURE: 94 MMHG | SYSTOLIC BLOOD PRESSURE: 181 MMHG

## 2018-11-08 VITALS — SYSTOLIC BLOOD PRESSURE: 150 MMHG | DIASTOLIC BLOOD PRESSURE: 77 MMHG

## 2018-11-08 VITALS — DIASTOLIC BLOOD PRESSURE: 64 MMHG | SYSTOLIC BLOOD PRESSURE: 118 MMHG

## 2018-11-08 VITALS — SYSTOLIC BLOOD PRESSURE: 126 MMHG | DIASTOLIC BLOOD PRESSURE: 78 MMHG

## 2018-11-08 VITALS — DIASTOLIC BLOOD PRESSURE: 87 MMHG | SYSTOLIC BLOOD PRESSURE: 156 MMHG

## 2018-11-08 VITALS — DIASTOLIC BLOOD PRESSURE: 103 MMHG | SYSTOLIC BLOOD PRESSURE: 158 MMHG

## 2018-11-08 LAB
ALBUMIN SERPL-MCNC: 2.6 G/DL (ref 3.4–5)
ALBUMIN/GLOB SERPL: 0.7 {RATIO} (ref 1–1.7)
ALP SERPL-CCNC: 72 U/L (ref 46–116)
ALT SERPL-CCNC: 15 U/L (ref 14–59)
ANION GAP SERPL CALC-SCNC: 6 MMOL/L (ref 6–14)
AST SERPL-CCNC: 22 U/L (ref 15–37)
BASOPHILS # BLD AUTO: 0 X10^3/UL (ref 0–0.2)
BASOPHILS NFR BLD: 1 % (ref 0–3)
BILIRUB SERPL-MCNC: 0.1 MG/DL (ref 0.2–1)
BUN SERPL-MCNC: 14 MG/DL (ref 7–20)
BUN/CREAT SERPL: 16 (ref 6–20)
CALCIUM SERPL-MCNC: 8.8 MG/DL (ref 8.5–10.1)
CHLORIDE SERPL-SCNC: 109 MMOL/L (ref 98–107)
CO2 SERPL-SCNC: 30 MMOL/L (ref 21–32)
CREAT SERPL-MCNC: 0.9 MG/DL (ref 0.6–1)
EOSINOPHIL NFR BLD: 0.6 X10^3/UL (ref 0–0.7)
EOSINOPHIL NFR BLD: 11 % (ref 0–3)
ERYTHROCYTE [DISTWIDTH] IN BLOOD BY AUTOMATED COUNT: 14.9 % (ref 11.5–14.5)
GFR SERPLBLD BASED ON 1.73 SQ M-ARVRAT: 79.6 ML/MIN
GLOBULIN SER-MCNC: 3.6 G/DL (ref 2.2–3.8)
GLUCOSE SERPL-MCNC: 111 MG/DL (ref 70–99)
HCT VFR BLD CALC: 32.4 % (ref 36–47)
HGB BLD-MCNC: 10.7 G/DL (ref 12–15.5)
LYMPHOCYTES # BLD: 2.1 X10^3/UL (ref 1–4.8)
LYMPHOCYTES NFR BLD AUTO: 39 % (ref 24–48)
MCH RBC QN AUTO: 30 PG (ref 25–35)
MCHC RBC AUTO-ENTMCNC: 33 G/DL (ref 31–37)
MCV RBC AUTO: 92 FL (ref 79–100)
MONO #: 0.5 X10^3/UL (ref 0–1.1)
MONOCYTES NFR BLD: 9 % (ref 0–9)
NEUT #: 2.2 X10^3UL (ref 1.8–7.7)
NEUTROPHILS NFR BLD AUTO: 41 % (ref 31–73)
PLATELET # BLD AUTO: 207 X10^3/UL (ref 140–400)
POTASSIUM SERPL-SCNC: 3.9 MMOL/L (ref 3.5–5.1)
PROT SERPL-MCNC: 6.2 G/DL (ref 6.4–8.2)
RBC # BLD AUTO: 3.53 X10^6/UL (ref 3.5–5.4)
SODIUM SERPL-SCNC: 145 MMOL/L (ref 136–145)
WBC # BLD AUTO: 5.3 X10^3/UL (ref 4–11)

## 2018-11-08 RX ADMIN — ENOXAPARIN SODIUM SCH MG: 40 INJECTION SUBCUTANEOUS at 13:35

## 2018-11-08 RX ADMIN — BACITRACIN SCH MLS/HR: 5000 INJECTION, POWDER, FOR SOLUTION INTRAMUSCULAR at 16:25

## 2018-11-08 RX ADMIN — BACITRACIN SCH MLS/HR: 5000 INJECTION, POWDER, FOR SOLUTION INTRAMUSCULAR at 04:57

## 2018-11-08 RX ADMIN — CEPHALEXIN SCH MG: 250 CAPSULE ORAL at 17:07

## 2018-11-08 RX ADMIN — CEPHALEXIN SCH MG: 250 CAPSULE ORAL at 20:33

## 2018-11-08 RX ADMIN — CEPHALEXIN SCH MG: 250 CAPSULE ORAL at 13:35

## 2018-11-08 RX ADMIN — Medication SCH CAP: at 20:33

## 2018-11-08 RX ADMIN — LISINOPRIL SCH MG: 20 TABLET ORAL at 08:26

## 2018-11-08 NOTE — RAD
Carotid ultrasound, 11/7/2018:

 

HISTORY: Dizziness, syncope

 

Duplex evaluation of the carotid arteries and neck was performed including

grayscale, color-flow and spectral Doppler analysis

 

There is moderate intimal thickening in the common carotid arteries 

bilaterally with mild smooth plaquing at the bifurcations. The peak 

systolic velocity in the right internal carotid artery is 99 cm per sec 

with an end-diastolic velocity of 30 cm/s and an internal carotid to 

common carotid artery ratio of 0.8. The peak systolic velocity in the left

internal carotid artery is 123 cm/s with an end-diastolic velocity of 26 

cm/s and an internal carotid to common carotid artery ratio of 0.9. These 

Doppler findings suggest narrowing in the 0-50 percent diameter range.

 

Antegrade flow is present in both vertebral arteries in the neck.

 

IMPRESSION: Mild atherosclerotic plaquing at both carotid bifurcations 

with underlying luminal narrowing in the 0-50 percent diameter range 

bilaterally.

 

 

Note:  Stenosis calculations for CT, MRA and conventional angiography are 

based upon determination of the distal ICA diameter in accordance with the

NASCET methodology.  Stenosis calculations for Doppler studies are derived

from validated velocity criteria which are known to correlate with NASCET 

methodology of determining stenosis.  

 

Electronically signed by: Rick Moritz, MD (11/8/2018 8:09 AM) [facility]

## 2018-11-08 NOTE — PDOC
PROGRESS NOTES


Assessment


Assessment


Syncopal spell.


Seizure evaluation, no evidence of seizure this time.


Fall.


HTN.


Emotional stress.


Smoking.


Obesity.





RECOMMENDATIONS/PLAN:


Treat medical diseases.


Weight reduction.


FU with PCP.





EEG on 11/8/18: Normal.


HCT and CCT: Negative.





HISTORY OF THE PRESENT ILLNESS:


This is a 53 -year-old AA female patient was brought to the ER of [facility] by EMS. 

The patient stated [place] 

then fell off her seat, hitting her head. The  called ambulance so 

she was brought here. She stays at shelter homes and cousin's house. When asked 

about her mood, she says she feels "betrayed" by her fiance. She denies 

suicidal and homicidal ideations. She stated she had a lot of emotional stress.





Past Medical History


Asthma, Hypertension, a seizure 15 yrs ago when pregnant


Pulmonary:  Bronchitis





Past Surgical History


Hysterectomy, bladder surgery





Family History


HLD, Hypertension. 





Allergies


Coded Allergies:  


codeine (Verified  Allergy, Mild, 11/6/18)





MEDICATIONS:


Refer to MAR





SOCIAL HISTORY: 


Lives with her cousin. alone. 


Denies illicit drug use. 


She smokes 1  pack of cigarettes a day for years.  


She drinks occasionally.





REVIEW OF SYSTEMS:


Constitutional: Obesity.


Head: No traumatic brain or head injury.


Skin: No edema, or rash.


Ear: No infection.


Eyes: No vision loss or color blindness.


Nose: No bleeding or purulent discharges.


Hearing: No hearing decrease.


Neck: No injury.


Breast: No history of cancer, masses,or discharges.


Cardiac: HTN.


Pulmonary: Asthma.


GI: No GI ulcer, GI bleeding.


Urinary/genital: UTI.


Endocrinologic: Obesity.


Skeletomuscular: No muscular atrophy, deformity.


Neurological: see  HP.


Psychiatric: Denies drug use/abuse.


Otherwise, not pertinant14-point review of systems.





PHYSICAL EXAMINATION:   


General appearance is in no acute distress.  


HEENT:  Normocephalic and nontraumatic.  Eyes, nose, ears, and throat are 

unremarkable.  


Neck is supple. No lymphadenopathy. No bruits are heard over the carotid 

artery.  No crepitus. 


Cardiovascular:  S1, S2, regular rate and rhythm.  


Pulmonary:  Clear to auscultation bilaterally. 


Abdomen:  Bowel sounds are positive.  Abdomen is soft, nontender, and 

nondistended.    


Extremities:  No rash, lesions, or edema.  


No restriction of range of motion





NEUROLOGICAL  EXAMINATION:


Alert 


Oriented to time, place and person.


PERRL.


EOMI.


CN: no focal findings.


Muscle tone: within normal.


Muscle strength: 5


DTR: 2


Plantar reflex: Flexor response bilaterally 


Gait: Normal.


Sensory exam: no abnormal findings.


No cerebellar signs elicited.


F-T-N test accurate.





Objective


Objective





Vital Signs








  Date Time  Temp Pulse Resp B/P (MAP) Pulse Ox O2 Delivery O2 Flow Rate FiO2


 


11/8/18 15:09 98.2 76 18 158/103 (121) 99 Room Air  





 98.2       














Intake and Output 


 


 11/8/18





 07:00


 


Intake Total 400 ml


 


Output Total 600 ml


 


Balance -200 ml


 


 


 


Intake Oral 400 ml


 


Output Urine Total 600 ml


 


# Voids 4











Vitals Signs


Vitals





 VS - Last 72 Hours, by Label








  Date Time  Temp Pulse Resp B/P (MAP) Pulse Ox O2 Delivery O2 Flow Rate FiO2


 


11/8/18 15:09 98.2 76 18 158/103 (121) 99 Room Air  





 98.2       


 


11/8/18 11:41 99.1 73 18 156/87 (110) 99 Room Air  





 99.1       


 


11/8/18 09:38  89  155/94 (114) 100 Room Air  


 


11/8/18 08:26  72  181/94    


 


11/8/18 08:00      Room Air  


 


11/8/18 07:55 97.7 72 20 181/94 (123) 99 Room Air  





 97.7       


 


11/8/18 03:49 97.7 78 18 118/64 (82) 97 Room Air  





 97.7       


 


11/7/18 23:41 98.0 82 18 170/98 (122) 98 Room Air  





 98.0       


 


11/7/18 20:00      Room Air  


 


11/7/18 19:48 98.1 72 18 149/92 (111) 96 Room Air  





 98.1       


 


11/7/18 15:55  79  183/96    


 


11/7/18 15:00 97.7 79 20 183/96 (125) 95 Room Air  





 97.7       


 


11/7/18 11:00 97.7 68 24 185/98 (127) 100 Room Air  





 97.7       


 


11/7/18 08:00      Room Air  


 


11/7/18 07:00 98.1 67 20 153/87 (109) 96 Room Air  





 98.1       











Laboratory


Laboratory





Laboratory Tests








Test


 11/8/18


03:05


 


White Blood Count


 5.3 x10^3/uL


(4.0-11.0)


 


Red Blood Count


 3.53 x10^6/uL


(3.50-5.40)


 


Hemoglobin


 10.7 g/dL


(12.0-15.5)


 


Hematocrit


 32.4 %


(36.0-47.0)


 


Mean Corpuscular Volume 92 fL () 


 


Mean Corpuscular Hemoglobin 30 pg (25-35) 


 


Mean Corpuscular Hemoglobin


Concent 33 g/dL


(31-37)


 


Red Cell Distribution Width


 14.9 %


(11.5-14.5)


 


Platelet Count


 207 x10^3/uL


(140-400)


 


Neutrophils (%) (Auto) 41 % (31-73) 


 


Lymphocytes (%) (Auto) 39 % (24-48) 


 


Monocytes (%) (Auto) 9 % (0-9) 


 


Eosinophils (%) (Auto) 11 % (0-3) 


 


Basophils (%) (Auto) 1 % (0-3) 


 


Neutrophils # (Auto)


 2.2 x10^3uL


(1.8-7.7)


 


Lymphocytes # (Auto)


 2.1 x10^3/uL


(1.0-4.8)


 


Monocytes # (Auto)


 0.5 x10^3/uL


(0.0-1.1)


 


Eosinophils # (Auto)


 0.6 x10^3/uL


(0.0-0.7)


 


Basophils # (Auto)


 0.0 x10^3/uL


(0.0-0.2)


 


Sodium Level


 145 mmol/L


(136-145)


 


Potassium Level


 3.9 mmol/L


(3.5-5.1)


 


Chloride Level


 109 mmol/L


()


 


Carbon Dioxide Level


 30 mmol/L


(21-32)


 


Anion Gap 6 (6-14) 


 


Blood Urea Nitrogen


 14 mg/dL


(7-20)


 


Creatinine


 0.9 mg/dL


(0.6-1.0)


 


Estimated GFR


(Cockcroft-Gault) 79.6 





 


BUN/Creatinine Ratio 16 (6-20) 


 


Glucose Level


 111 mg/dL


(70-99)


 


Calcium Level


 8.8 mg/dL


(8.5-10.1)


 


Total Bilirubin


 0.1 mg/dL


(0.2-1.0)


 


Aspartate Amino Transf


(AST/SGOT) 22 U/L (15-37) 





 


Alanine Aminotransferase


(ALT/SGPT) 15 U/L (14-59) 





 


Alkaline Phosphatase


 72 U/L


()


 


Total Protein


 6.2 g/dL


(6.4-8.2)


 


Albumin


 2.6 g/dL


(3.4-5.0)


 


Albumin/Globulin Ratio 0.7 (1.0-1.7) 











Medication


Medications





Current Medications


Cephalexin HCl (Keflex) 500 mg QID PO  Last administered on 11/8/18at 17:07;  

Start 11/8/18 at 13:00


Lactobacillus Rhamnosus (Culturelle) 1 cap BID PO ;  Start 11/8/18 at 21:00





Comment


Review of Relevant


I have reviewed the following items tomasz (where applicable) has been applied.











SOHAIL BYRNE MD Nov 8, 2018 19:01

## 2018-11-08 NOTE — PDOC
PROGRESS NOTES


History of Present Illness


History of Present Illness





Assessment/Plan


Assessment/Plan


           Impression:  


            possible seizure, reports a seizure 15 yrs ago when pregnant


 Syncope


 mechanical fall


 tobacco abuse


 morbid obesity


 no acute intracranial abnormality.  


            No acute fracture of the cervical spine.


            hx hypertension


            mild hypernatremia, volume depleted


            possible bipolar


            RECENT PUNCTURE wound to right plantar foot, has been on keflex PTA


 


 


 plan


 


 SEIZURE PRECAUTIONS


 EEG


 iv fluids


 follow lytes


 doppler carotids OK


 tele


 neurochecks q 4 hrs


 neurology FOLLOWING


 ct head REVIEWED





Vitals


Vitals





Vital Signs








  Date Time  Temp Pulse Resp B/P (MAP) Pulse Ox O2 Delivery O2 Flow Rate FiO2


 


11/8/18 09:38  89  155/94 (114) 100 Room Air  


 


11/8/18 07:55 97.7  20     





 97.7       











Physical Exam


General:  Alert, Oriented X3, Cooperative, No acute distress


Heart:  Regular rate, Normal S1


Lungs:  Clear


Abdomen:  Soft


Extremities:  No cyanosis





Labs


LABS





Carotid ultrasound, 11/7/2018:


 


HISTORY: Dizziness, syncope


 


Duplex evaluation of the carotid arteries and neck was performed including


grayscale, color-flow and spectral Doppler analysis


 


There is moderate intimal thickening in the common carotid arteries 


bilaterally with mild smooth plaquing at the bifurcations. The peak 


systolic velocity in the right internal carotid artery is 99 cm per sec 


with an end-diastolic velocity of 30 cm/s and an internal carotid to 


common carotid artery ratio of 0.8. The peak systolic velocity in the left


internal carotid artery is 123 cm/s with an end-diastolic velocity of 26 


cm/s and an internal carotid to common carotid artery ratio of 0.9. These 


Doppler findings suggest narrowing in the 0-50 percent diameter range.


 


Antegrade flow is present in both vertebral arteries in the neck.


 


IMPRESSION: Mild atherosclerotic plaquing at both carotid bifurcations 


with underlying luminal narrowing in the 0-50 percent diameter range 


bilaterally.


 


 


Note:  Stenosis calculations for CT, MRA and conventional angiography are 


based upon determination of the distal ICA diameter in accordance with the


NASCET methodology.  Stenosis calculations for Doppler studies are derived


from validated velocity criteria which are known to correlate with NASCET 


methodology of determining stenosis.  


 


Electronically signed by: Rick Moritz, MD (11/8/2018 8:09 AM) [facility]





Laboratory Tests








Test


 11/8/18


03:05


 


White Blood Count


 5.3 x10^3/uL


(4.0-11.0)


 


Red Blood Count


 3.53 x10^6/uL


(3.50-5.40)


 


Hemoglobin


 10.7 g/dL


(12.0-15.5)


 


Hematocrit


 32.4 %


(36.0-47.0)


 


Mean Corpuscular Volume 92 fL () 


 


Mean Corpuscular Hemoglobin 30 pg (25-35) 


 


Mean Corpuscular Hemoglobin


Concent 33 g/dL


(31-37)


 


Red Cell Distribution Width


 14.9 %


(11.5-14.5)


 


Platelet Count


 207 x10^3/uL


(140-400)


 


Neutrophils (%) (Auto) 41 % (31-73) 


 


Lymphocytes (%) (Auto) 39 % (24-48) 


 


Monocytes (%) (Auto) 9 % (0-9) 


 


Eosinophils (%) (Auto) 11 % (0-3) 


 


Basophils (%) (Auto) 1 % (0-3) 


 


Neutrophils # (Auto)


 2.2 x10^3uL


(1.8-7.7)


 


Lymphocytes # (Auto)


 2.1 x10^3/uL


(1.0-4.8)


 


Monocytes # (Auto)


 0.5 x10^3/uL


(0.0-1.1)


 


Eosinophils # (Auto)


 0.6 x10^3/uL


(0.0-0.7)


 


Basophils # (Auto)


 0.0 x10^3/uL


(0.0-0.2)


 


Sodium Level


 145 mmol/L


(136-145)


 


Potassium Level


 3.9 mmol/L


(3.5-5.1)


 


Chloride Level


 109 mmol/L


()


 


Carbon Dioxide Level


 30 mmol/L


(21-32)


 


Anion Gap 6 (6-14) 


 


Blood Urea Nitrogen


 14 mg/dL


(7-20)


 


Creatinine


 0.9 mg/dL


(0.6-1.0)


 


Estimated GFR


(Cockcroft-Gault) 79.6 





 


BUN/Creatinine Ratio 16 (6-20) 


 


Glucose Level


 111 mg/dL


(70-99)


 


Calcium Level


 8.8 mg/dL


(8.5-10.1)


 


Total Bilirubin


 0.1 mg/dL


(0.2-1.0)


 


Aspartate Amino Transf


(AST/SGOT) 22 U/L (15-37) 





 


Alanine Aminotransferase


(ALT/SGPT) 15 U/L (14-59) 





 


Alkaline Phosphatase


 72 U/L


()


 


Total Protein


 6.2 g/dL


(6.4-8.2)


 


Albumin


 2.6 g/dL


(3.4-5.0)


 


Albumin/Globulin Ratio 0.7 (1.0-1.7) 











Assessment and Plan


Assessmemt and Plan


Problems


Medical Problems:


(1) Syncope


Status: Acute  











Comment


Review of Relevant


I have reviewed the following items tomasz (where applicable) has been applied.


Labs





Laboratory Tests








Test


 11/6/18


23:57 11/7/18


00:20 11/7/18


04:00 11/7/18


07:25


 


Urine Collection Type Unknown    


 


Urine Color Yellow    


 


Urine Clarity Clear    


 


Urine pH 6.0    


 


Urine Specific Gravity 1.025    


 


Urine Protein


 Negative mg/dL


(NEG-TRACE) 


 


 





 


Urine Glucose (UA)


 Negative mg/dL


(NEG) 


 


 





 


Urine Ketones (Stick)


 Negative mg/dL


(NEG) 


 


 





 


Urine Blood Negative (NEG)    


 


Urine Nitrite Negative (NEG)    


 


Urine Bilirubin Negative (NEG)    


 


Urine Urobilinogen Dipstick


 1.0 mg/dL (0.2


mg/dL) 


 


 





 


Urine Leukocyte Esterase Negative (NEG)    


 


Urine RBC 0 /HPF (0-2)    


 


Urine WBC Occ /HPF (0-4)    


 


Urine Squamous Epithelial


Cells Few /LPF 


 


 


 





 


Urine Bacteria 0 /HPF (0-FEW)    


 


Urine Mucus Mod /LPF    


 


Urine Opiates Screen Neg (NEG)    


 


Urine Methadone Screen Neg (NEG)    


 


Urine Barbiturates Neg (NEG)    


 


Urine Phencyclidine Screen Neg (NEG)    


 


Urine


Amphetamine/Methamphetamine Neg (NEG) 


 


 


 





 


Urine Benzodiazepines Screen Neg (NEG)    


 


Urine Cocaine Screen Neg (NEG)    


 


Urine Cannabinoids Screen Neg (NEG)    


 


Urine Ethyl Alcohol Neg (NEG)    


 


White Blood Count


 


 6.9 x10^3/uL


(4.0-11.0) 


 





 


Red Blood Count


 


 4.11 x10^6/uL


(3.50-5.40) 


 





 


Hemoglobin


 


 12.5 g/dL


(12.0-15.5) 


 





 


Hematocrit


 


 37.4 %


(36.0-47.0) 


 





 


Mean Corpuscular Volume  91 fL ()   


 


Mean Corpuscular Hemoglobin  30 pg (25-35)   


 


Mean Corpuscular Hemoglobin


Concent 


 33 g/dL


(31-37) 


 





 


Red Cell Distribution Width


 


 14.6 %


(11.5-14.5) 


 





 


Platelet Count


 


 257 x10^3/uL


(140-400) 


 





 


Neutrophils (%) (Auto)  56 % (31-73)   


 


Lymphocytes (%) (Auto)  28 % (24-48)   


 


Monocytes (%) (Auto)  8 % (0-9)   


 


Eosinophils (%) (Auto)  8 % (0-3)   


 


Basophils (%) (Auto)  1 % (0-3)   


 


Neutrophils # (Auto)


 


 3.9 x10^3uL


(1.8-7.7) 


 





 


Lymphocytes # (Auto)


 


 1.9 x10^3/uL


(1.0-4.8) 


 





 


Monocytes # (Auto)


 


 0.5 x10^3/uL


(0.0-1.1) 


 





 


Eosinophils # (Auto)


 


 0.5 x10^3/uL


(0.0-0.7) 


 





 


Basophils # (Auto)


 


 0.0 x10^3/uL


(0.0-0.2) 


 





 


Sodium Level


 


 147 mmol/L


(136-145) 


 





 


Potassium Level


 


 3.9 mmol/L


(3.5-5.1) 


 





 


Chloride Level


 


 109 mmol/L


() 


 





 


Carbon Dioxide Level


 


 31 mmol/L


(21-32) 


 





 


Anion Gap  7 (6-14)   


 


Blood Urea Nitrogen


 


 17 mg/dL


(7-20) 


 





 


Creatinine


 


 1.0 mg/dL


(0.6-1.0) 


 





 


Estimated GFR


(Cockcroft-Gault) 


 70.5 


 


 





 


BUN/Creatinine Ratio  17 (6-20)   


 


Glucose Level


 


 90 mg/dL


(70-99) 


 





 


Calcium Level


 


 9.7 mg/dL


(8.5-10.1) 


 





 


Magnesium Level


 


 2.1 mg/dL


(1.8-2.4) 


 





 


Total Bilirubin


 


 0.3 mg/dL


(0.2-1.0) 


 





 


Aspartate Amino Transf


(AST/SGOT) 


 20 U/L (15-37) 


 


 





 


Alanine Aminotransferase


(ALT/SGPT) 


 17 U/L (14-59) 


 


 





 


Alkaline Phosphatase


 


 78 U/L


() 


 





 


Creatine Kinase


 


 193 U/L


() 


 





 


Creatine Kinase MB (Mass)


 


 2.5 ng/mL


(0.0-3.6) 


 





 


Creatine Kinase MB Relative


Index 


 1.3 % (0-4) 


 


 





 


Troponin I Quantitative


 


 < 0.017 ng/mL


(0.000-0.055) < 0.017 ng/mL


(0.000-0.055) < 0.017 ng/mL


(0.000-0.055)


 


Total Protein


 


 7.6 g/dL


(6.4-8.2) 


 





 


Albumin


 


 3.3 g/dL


(3.4-5.0) 


 





 


Albumin/Globulin Ratio  0.8 (1.0-1.7)   


 


Ethyl Alcohol Level


 


 < 10 mg/dL


(0-10) 


 





 


Thyroid Stimulating Hormone


(TSH) 


 


 


 1.179 uIU/mL


(0.358-3.74)


 


Test


 11/8/18


03:05 


 


 





 


White Blood Count


 5.3 x10^3/uL


(4.0-11.0) 


 


 





 


Red Blood Count


 3.53 x10^6/uL


(3.50-5.40) 


 


 





 


Hemoglobin


 10.7 g/dL


(12.0-15.5) 


 


 





 


Hematocrit


 32.4 %


(36.0-47.0) 


 


 





 


Mean Corpuscular Volume 92 fL ()    


 


Mean Corpuscular Hemoglobin 30 pg (25-35)    


 


Mean Corpuscular Hemoglobin


Concent 33 g/dL


(31-37) 


 


 





 


Red Cell Distribution Width


 14.9 %


(11.5-14.5) 


 


 





 


Platelet Count


 207 x10^3/uL


(140-400) 


 


 





 


Neutrophils (%) (Auto) 41 % (31-73)    


 


Lymphocytes (%) (Auto) 39 % (24-48)    


 


Monocytes (%) (Auto) 9 % (0-9)    


 


Eosinophils (%) (Auto) 11 % (0-3)    


 


Basophils (%) (Auto) 1 % (0-3)    


 


Neutrophils # (Auto)


 2.2 x10^3uL


(1.8-7.7) 


 


 





 


Lymphocytes # (Auto)


 2.1 x10^3/uL


(1.0-4.8) 


 


 





 


Monocytes # (Auto)


 0.5 x10^3/uL


(0.0-1.1) 


 


 





 


Eosinophils # (Auto)


 0.6 x10^3/uL


(0.0-0.7) 


 


 





 


Basophils # (Auto)


 0.0 x10^3/uL


(0.0-0.2) 


 


 





 


Sodium Level


 145 mmol/L


(136-145) 


 


 





 


Potassium Level


 3.9 mmol/L


(3.5-5.1) 


 


 





 


Chloride Level


 109 mmol/L


() 


 


 





 


Carbon Dioxide Level


 30 mmol/L


(21-32) 


 


 





 


Anion Gap 6 (6-14)    


 


Blood Urea Nitrogen


 14 mg/dL


(7-20) 


 


 





 


Creatinine


 0.9 mg/dL


(0.6-1.0) 


 


 





 


Estimated GFR


(Cockcroft-Gault) 79.6 


 


 


 





 


BUN/Creatinine Ratio 16 (6-20)    


 


Glucose Level


 111 mg/dL


(70-99) 


 


 





 


Calcium Level


 8.8 mg/dL


(8.5-10.1) 


 


 





 


Total Bilirubin


 0.1 mg/dL


(0.2-1.0) 


 


 





 


Aspartate Amino Transf


(AST/SGOT) 22 U/L (15-37) 


 


 


 





 


Alanine Aminotransferase


(ALT/SGPT) 15 U/L (14-59) 


 


 


 





 


Alkaline Phosphatase


 72 U/L


() 


 


 





 


Total Protein


 6.2 g/dL


(6.4-8.2) 


 


 





 


Albumin


 2.6 g/dL


(3.4-5.0) 


 


 





 


Albumin/Globulin Ratio 0.7 (1.0-1.7)    








Laboratory Tests








Test


 11/8/18


03:05


 


White Blood Count


 5.3 x10^3/uL


(4.0-11.0)


 


Red Blood Count


 3.53 x10^6/uL


(3.50-5.40)


 


Hemoglobin


 10.7 g/dL


(12.0-15.5)


 


Hematocrit


 32.4 %


(36.0-47.0)


 


Mean Corpuscular Volume 92 fL () 


 


Mean Corpuscular Hemoglobin 30 pg (25-35) 


 


Mean Corpuscular Hemoglobin


Concent 33 g/dL


(31-37)


 


Red Cell Distribution Width


 14.9 %


(11.5-14.5)


 


Platelet Count


 207 x10^3/uL


(140-400)


 


Neutrophils (%) (Auto) 41 % (31-73) 


 


Lymphocytes (%) (Auto) 39 % (24-48) 


 


Monocytes (%) (Auto) 9 % (0-9) 


 


Eosinophils (%) (Auto) 11 % (0-3) 


 


Basophils (%) (Auto) 1 % (0-3) 


 


Neutrophils # (Auto)


 2.2 x10^3uL


(1.8-7.7)


 


Lymphocytes # (Auto)


 2.1 x10^3/uL


(1.0-4.8)


 


Monocytes # (Auto)


 0.5 x10^3/uL


(0.0-1.1)


 


Eosinophils # (Auto)


 0.6 x10^3/uL


(0.0-0.7)


 


Basophils # (Auto)


 0.0 x10^3/uL


(0.0-0.2)


 


Sodium Level


 145 mmol/L


(136-145)


 


Potassium Level


 3.9 mmol/L


(3.5-5.1)


 


Chloride Level


 109 mmol/L


()


 


Carbon Dioxide Level


 30 mmol/L


(21-32)


 


Anion Gap 6 (6-14) 


 


Blood Urea Nitrogen


 14 mg/dL


(7-20)


 


Creatinine


 0.9 mg/dL


(0.6-1.0)


 


Estimated GFR


(Cockcroft-Gault) 79.6 





 


BUN/Creatinine Ratio 16 (6-20) 


 


Glucose Level


 111 mg/dL


(70-99)


 


Calcium Level


 8.8 mg/dL


(8.5-10.1)


 


Total Bilirubin


 0.1 mg/dL


(0.2-1.0)


 


Aspartate Amino Transf


(AST/SGOT) 22 U/L (15-37) 





 


Alanine Aminotransferase


(ALT/SGPT) 15 U/L (14-59) 





 


Alkaline Phosphatase


 72 U/L


()


 


Total Protein


 6.2 g/dL


(6.4-8.2)


 


Albumin


 2.6 g/dL


(3.4-5.0)


 


Albumin/Globulin Ratio 0.7 (1.0-1.7) 








Medications





Current Medications


Sodium Chloride 1,000 ml @  1,000 mls/hr 1X  ONCE IV  Last administered on 11/7/ 18at 00:46;  Start 11/6/18 at 23:00;  Stop 11/6/18 at 23:59;  Status DC


Aspirin (Shai Aspirin) 325 mg 1X  ONCE PO  Last administered on 11/7/18at 01:27

;  Start 11/7/18 at 01:15;  Stop 11/7/18 at 01:16;  Status DC


Ondansetron HCl (Zofran) 4 mg PRN Q8HRS  PRN IV NAUSEA/VOMITING;  Start 11/7/18 

at 01:15;  Stop 11/8/18 at 01:14;  Status DC


Lisinopril (Prinivil) 20 mg DAILY PO  Last administered on 11/8/18at 08:26;  

Start 11/7/18 at 14:00


Enoxaparin Sodium (Lovenox 40mg Syringe) 40 mg Q24H SQ  Last administered on 11/ 7/18at 15:56;  Start 11/7/18 at 14:00


Sodium Chloride 1,000 ml @  75 mls/hr [id number] IV  Last administered on 11/8/ 18at 04:57;  Start 11/7/18 at 13:45





Active Scripts


Active


Reported


Lisinopril 20 Mg Tablet 1 Tab PO DAILY


Vitals/I & O





Vital Sign - Last 24 Hours








 11/7/18 11/7/18 11/7/18 11/7/18





 11:00 15:00 15:55 19:48


 


Temp 97.7 97.7  98.1





 97.7 97.7  98.1


 


Pulse 68 79 79 72


 


Resp 24 20  18


 


B/P (MAP) 185/98 (127) 183/96 (125) 183/96 149/92 (111)


 


Pulse Ox 100 95  96


 


O2 Delivery Room Air Room Air  Room Air


 


    





    





 11/7/18 11/7/18 11/8/18 11/8/18





 20:00 23:41 03:49 07:55


 


Temp  98.0 97.7 97.7





  98.0 97.7 97.7


 


Pulse  82 78 72


 


Resp  18 18 20


 


B/P (MAP)  170/98 (122) 118/64 (82) 181/94 (123)


 


Pulse Ox  98 97 99


 


O2 Delivery Room Air Room Air Room Air Room Air


 


    





    





 11/8/18 11/8/18 11/8/18 





 08:00 08:26 09:38 


 


Pulse  72 89 


 


B/P (MAP)  181/94 155/94 (114) 


 


Pulse Ox   100 


 


O2 Delivery Room Air  Room Air 














Intake and Output   


 


 11/7/18 11/7/18 11/8/18





 15:00 23:00 07:00


 


Intake Total   400 ml


 


Output Total 400 ml  200 ml


 


Balance -400 ml  200 ml

















GHULAM MOYER MD Nov 8, 2018 10:26

## 2018-11-08 NOTE — PDOC
PROGRESS NOTES


Assessment


Assessment


Syncopal spell.


Seizure evaluation.


Fall.


HTN.


Emotional stress.


Smoking.


Obesity.





RECOMMENDATIONS/PLAN:


EEG on 11/8/18.


Treat medical diseases.


Weight reduction.


FU with PCP.





HISTORY OF THE PRESENT ILLNESS:


This is a 53 -year-old AA female patient was brought to the ER of [facility] by EMS. 

The patient stated that she was [place] [facility] [place] but fell asleep 

then fell off her seat, hitting her head. The  called ambulance so 

she was brought here. She stays at shelter homes and cousin's house. When asked 

about her mood, she says she feels "betrayed" by her fiance. She denies 

suicidal and homicidal ideations. She stated she had a lot of emotional stress.





Past Medical History


Asthma, Hypertension, a seizure 15 yrs ago when pregnant


Pulmonary:  Bronchitis





Past Surgical History


Hysterectomy, bladder surgery





Family History


HLD, Hypertension. 





Allergies


Coded Allergies:  


codeine (Verified  Allergy, Mild, 11/6/18)





MEDICATIONS:


Refer to [facility]





SOCIAL HISTORY: 


Lives with her cousin. alone. 


Denies illicit drug use. 


She smokes 1  pack of cigarettes a day for years.  


She drinks occasionally.





REVIEW OF SYSTEMS:


Constitutional: Obesity.


Head: No traumatic brain or head injury.


Skin: No edema, or rash.


Ear: No infection.


Eyes: No vision loss or color blindness.


Nose: No bleeding or purulent discharges.


Hearing: No hearing decrease.


Neck: No injury.


Breast: No history of cancer, masses,or discharges.


Cardiac: HTN.


Pulmonary: Asthma.


GI: No GI ulcer, GI bleeding.


Urinary/genital: UTI.


Endocrinologic: Obesity.


Skeletomuscular: No muscular atrophy, deformity.


Neurological: see  HP.


Psychiatric: Denies drug use/abuse.


Otherwise, not pertinant14-point review of systems.





PHYSICAL EXAMINATION:   


General appearance is in no acute distress.  


HEENT:  Normocephalic and nontraumatic.  Eyes, nose, ears, and throat are 

unremarkable.  


Neck is supple. No lymphadenopathy. No bruits are heard over the carotid 

artery.  No crepitus. 


Cardiovascular:  S1, S2, regular rate and rhythm.  


Pulmonary:  Clear to auscultation bilaterally. 


Abdomen:  Bowel sounds are positive.  Abdomen is soft, nontender, and 

nondistended.    


Extremities:  No rash, lesions, or edema.  


No restriction of range of motion





NEUROLOGICAL  EXAMINATION:


Alert 


Oriented to time, place and person.


PERRL.


EOMI.


CN: no focal findings.


Muscle tone: within normal.


Muscle strength: 5


DTR: 2


Plantar reflex: Flexor response bilaterally 


Gait: Normal.


Sensory exam: no abnormal findings.


No cerebellar signs elicited.


F-T-N test accurate.





Objective


Objective





Vital Signs








  Date Time  Temp Pulse Resp B/P (MAP) Pulse Ox O2 Delivery O2 Flow Rate FiO2


 


11/8/18 15:09 98.2 76 18 158/103 (121) 99 Room Air  





 98.2       














Intake and Output 


 


 11/8/18





 07:00


 


Intake Total 400 ml


 


Output Total 600 ml


 


Balance -200 ml


 


 


 


Intake Oral 400 ml


 


Output Urine Total 600 ml


 


# Voids 4











Vitals Signs


Vitals





 VS - Last 72 Hours, by Label








  Date Time  Temp Pulse Resp B/P (MAP) Pulse Ox O2 Delivery O2 Flow Rate FiO2


 


11/8/18 15:09 98.2 76 18 158/103 (121) 99 Room Air  





 98.2       


 


11/8/18 11:41 99.1 73 18 156/87 (110) 99 Room Air  





 99.1       


 


11/8/18 09:38  89  155/94 (114) 100 Room Air  


 


11/8/18 08:26  72  181/94    


 


11/8/18 08:00      Room Air  


 


11/8/18 07:55 97.7 72 20 181/94 (123) 99 Room Air  





 97.7       


 


11/8/18 03:49 97.7 78 18 118/64 (82) 97 Room Air  





 97.7       


 


11/7/18 23:41 98.0 82 18 170/98 (122) 98 Room Air  





 98.0       


 


11/7/18 20:00      Room Air  


 


11/7/18 19:48 98.1 72 18 149/92 (111) 96 Room Air  





 98.1       


 


11/7/18 15:55  79  183/96    


 


11/7/18 15:00 97.7 79 20 183/96 (125) 95 Room Air  





 97.7       


 


11/7/18 11:00 97.7 68 24 185/98 (127) 100 Room Air  





 97.7       


 


11/7/18 08:00      Room Air  


 


11/7/18 07:00 98.1 67 20 153/87 (109) 96 Room Air  





 98.1       











Laboratory


Laboratory





Laboratory Tests








Test


 11/8/18


03:05


 


White Blood Count


 5.3 x10^3/uL


(4.0-11.0)


 


Red Blood Count


 3.53 x10^6/uL


(3.50-5.40)


 


Hemoglobin


 10.7 g/dL


(12.0-15.5)


 


Hematocrit


 32.4 %


(36.0-47.0)


 


Mean Corpuscular Volume 92 fL () 


 


Mean Corpuscular Hemoglobin 30 pg (25-35) 


 


Mean Corpuscular Hemoglobin


Concent 33 g/dL


(31-37)


 


Red Cell Distribution Width


 14.9 %


(11.5-14.5)


 


Platelet Count


 207 x10^3/uL


(140-400)


 


Neutrophils (%) (Auto) 41 % (31-73) 


 


Lymphocytes (%) (Auto) 39 % (24-48) 


 


Monocytes (%) (Auto) 9 % (0-9) 


 


Eosinophils (%) (Auto) 11 % (0-3) 


 


Basophils (%) (Auto) 1 % (0-3) 


 


Neutrophils # (Auto)


 2.2 x10^3uL


(1.8-7.7)


 


Lymphocytes # (Auto)


 2.1 x10^3/uL


(1.0-4.8)


 


Monocytes # (Auto)


 0.5 x10^3/uL


(0.0-1.1)


 


Eosinophils # (Auto)


 0.6 x10^3/uL


(0.0-0.7)


 


Basophils # (Auto)


 0.0 x10^3/uL


(0.0-0.2)


 


Sodium Level


 145 mmol/L


(136-145)


 


Potassium Level


 3.9 mmol/L


(3.5-5.1)


 


Chloride Level


 109 mmol/L


()


 


Carbon Dioxide Level


 30 mmol/L


(21-32)


 


Anion Gap 6 (6-14) 


 


Blood Urea Nitrogen


 14 mg/dL


(7-20)


 


Creatinine


 0.9 mg/dL


(0.6-1.0)


 


Estimated GFR


(Cockcroft-Gault) 79.6 





 


BUN/Creatinine Ratio 16 (6-20) 


 


Glucose Level


 111 mg/dL


(70-99)


 


Calcium Level


 8.8 mg/dL


(8.5-10.1)


 


Total Bilirubin


 0.1 mg/dL


(0.2-1.0)


 


Aspartate Amino Transf


(AST/SGOT) 22 U/L (15-37) 





 


Alanine Aminotransferase


(ALT/SGPT) 15 U/L (14-59) 





 


Alkaline Phosphatase


 72 U/L


()


 


Total Protein


 6.2 g/dL


(6.4-8.2)


 


Albumin


 2.6 g/dL


(3.4-5.0)


 


Albumin/Globulin Ratio 0.7 (1.0-1.7) 











Medication


Medications





Current Medications


Cephalexin HCl (Keflex) 500 mg QID PO  Last administered on 11/8/18at 17:07;  

Start 11/8/18 at 13:00


Lactobacillus Rhamnosus (Culturelle) 1 cap BID PO ;  Start 11/8/18 at 21:00





Comment


Review of Relevant


I have reviewed the following items tomasz (where applicable) has been applied.











SOHAIL BYRNE MD Nov 8, 2018 17:34

## 2018-11-08 NOTE — EEG
DATE OF SERVICE:  11/08/2018



EEG NUMBER:  [id number].



OBJECTIVE:  This is a 53-year-old -American female patient with history

of syncope and seizure evaluation.  EEG was requested to help rule out seizure.



METHODS:  Twenty electrodes were applied according to the international 10-20

electrode placement system.  EKG monitoring, hyperventilation, intermittent

photic stimulation, monopolar and bipolar montages are routinely utilized.  The

record was obtained on a digital system with video monitoring.



FINDINGS:



1.  Background:  The patient was recorded in the awake and drowsy states.  No

actual sleep state was recorded.  The overall background amplitude is 10-30

microvolts.  A posterior dominant rhythm of 10 Hz is observed.



2.  Abnormalities:  No specific epileptiform discharge or electrographic seizure

is seen.  No focal or diffuse slowing.



3.  Activation:  Hyperventilation was performed with good efforts and normal

response.  Intermittent photic stimulation was performed with photic driving. 

No specific epileptiform discharge or electrographic seizure induced by

hyperventilation or intermittent photic stimulation.



IMPRESSION:  This EEG is a normal study for the awake and drowsy states.  No

sleep state was recorded.  No focal, lateralizing, specific epileptiform

discharge or electrographic seizure is seen.

 



______________________________

SOHAIL BYRNE MD



DR:  [email or web address]  JOB#:  [id number] / [id number]

DD:  11/08/2018 18:14  DT:  11/08/2018 19:02

[place]

## 2018-11-09 VITALS — SYSTOLIC BLOOD PRESSURE: 183 MMHG | DIASTOLIC BLOOD PRESSURE: 84 MMHG

## 2018-11-09 VITALS — DIASTOLIC BLOOD PRESSURE: 94 MMHG | SYSTOLIC BLOOD PRESSURE: 176 MMHG

## 2018-11-09 VITALS — SYSTOLIC BLOOD PRESSURE: 133 MMHG | DIASTOLIC BLOOD PRESSURE: 76 MMHG

## 2018-11-09 LAB
ALBUMIN SERPL-MCNC: 3.2 G/DL (ref 3.4–5)
ALBUMIN/GLOB SERPL: 0.8 {RATIO} (ref 1–1.7)
ALP SERPL-CCNC: 75 U/L (ref 46–116)
ALT SERPL-CCNC: 17 U/L (ref 14–59)
ANION GAP SERPL CALC-SCNC: 6 MMOL/L (ref 6–14)
AST SERPL-CCNC: 16 U/L (ref 15–37)
BASOPHILS # BLD AUTO: 0.1 X10^3/UL (ref 0–0.2)
BASOPHILS NFR BLD: 1 % (ref 0–3)
BILIRUB SERPL-MCNC: 0.2 MG/DL (ref 0.2–1)
BUN SERPL-MCNC: 14 MG/DL (ref 7–20)
BUN/CREAT SERPL: 16 (ref 6–20)
CALCIUM SERPL-MCNC: 9.3 MG/DL (ref 8.5–10.1)
CHLORIDE SERPL-SCNC: 106 MMOL/L (ref 98–107)
CO2 SERPL-SCNC: 33 MMOL/L (ref 21–32)
CREAT SERPL-MCNC: 0.9 MG/DL (ref 0.6–1)
EOSINOPHIL NFR BLD: 0.5 X10^3/UL (ref 0–0.7)
EOSINOPHIL NFR BLD: 9 % (ref 0–3)
ERYTHROCYTE [DISTWIDTH] IN BLOOD BY AUTOMATED COUNT: 14.8 % (ref 11.5–14.5)
GFR SERPLBLD BASED ON 1.73 SQ M-ARVRAT: 79.6 ML/MIN
GLOBULIN SER-MCNC: 4.2 G/DL (ref 2.2–3.8)
GLUCOSE SERPL-MCNC: 72 MG/DL (ref 70–99)
HCT VFR BLD CALC: 37.5 % (ref 36–47)
HGB BLD-MCNC: 12 G/DL (ref 12–15.5)
LYMPHOCYTES # BLD: 1.6 X10^3/UL (ref 1–4.8)
LYMPHOCYTES NFR BLD AUTO: 29 % (ref 24–48)
MCH RBC QN AUTO: 30 PG (ref 25–35)
MCHC RBC AUTO-ENTMCNC: 32 G/DL (ref 31–37)
MCV RBC AUTO: 92 FL (ref 79–100)
MONO #: 0.4 X10^3/UL (ref 0–1.1)
MONOCYTES NFR BLD: 6 % (ref 0–9)
NEUT #: 3.1 X10^3UL (ref 1.8–7.7)
NEUTROPHILS NFR BLD AUTO: 55 % (ref 31–73)
PLATELET # BLD AUTO: 249 X10^3/UL (ref 140–400)
POTASSIUM SERPL-SCNC: 3.9 MMOL/L (ref 3.5–5.1)
PROT SERPL-MCNC: 7.4 G/DL (ref 6.4–8.2)
RBC # BLD AUTO: 4.07 X10^6/UL (ref 3.5–5.4)
SODIUM SERPL-SCNC: 145 MMOL/L (ref 136–145)
WBC # BLD AUTO: 5.6 X10^3/UL (ref 4–11)

## 2018-11-09 RX ADMIN — LISINOPRIL SCH MG: 20 TABLET ORAL at 09:29

## 2018-11-09 RX ADMIN — CEPHALEXIN SCH MG: 250 CAPSULE ORAL at 09:28

## 2018-11-09 RX ADMIN — BACITRACIN SCH MLS/HR: 5000 INJECTION, POWDER, FOR SOLUTION INTRAMUSCULAR at 09:28

## 2018-11-09 RX ADMIN — CEPHALEXIN SCH MG: 250 CAPSULE ORAL at 14:29

## 2018-11-09 RX ADMIN — Medication SCH CAP: at 09:29

## 2018-11-09 RX ADMIN — ENOXAPARIN SODIUM SCH MG: 40 INJECTION SUBCUTANEOUS at 13:40

## 2018-11-09 NOTE — DISCH
DISCHARGE INSTRUCTIONS


Condition on Discharge


Condition on Discharge:  Stable





Activity After Discharge


Activity Instructions for Disc:  Activity as tolerated


Lifting Instructions after Dis:  No heavy lifting, No pulling or pushing


Exercise Instruction after Dis:  Walk 10 min, 3 x per day


Driving Instructions after Dis:  Do not drive today





Diet after Discharge


Diet after Discharge:  Low Sodium 2 gm





Checks after Discharge


Checks after discharge:  Check blood press - daily





Contacting the DR. after DC


Call your doctor for:  If your condition worsens











GHULAM MOYER MD Nov 9, 2018 12:42

## 2018-11-09 NOTE — PDOC
PROGRESS NOTES


History of Present Illness


History of Present Illness





Assessment/Plan


Assessment/Plan


           Impression:  


            possible seizure, reports a seizure 15 yrs ago when pregnant, EEG 

OK NO EVIDENCE OF CURRENT SEIZURE


 Syncope


 mechanical fall


 tobacco abuse, EDUCATED TO CEASE


 morbid obesity


 no acute intracranial abnormality.  


            No acute fracture of the cervical spine.


            hx hypertension


            mild hypernatremia, volume depleted


            possible bipolar


            RECENT PUNCTURE wound to right plantar foot, has been on keflex PTA


 


 


 plan


 


 SEIZURE PRECAUTIONS


 EEG


 iv fluids


 follow lytes


 doppler carotids OK


 tele


 neurochecks q 4 hrs


 neurology FOLLOWING OK WITH D/C 


 ct head REVIEWED





Vitals


Vitals





Vital Signs








  Date Time  Temp Pulse Resp B/P (MAP) Pulse Ox O2 Delivery O2 Flow Rate FiO2


 


11/9/18 11:00 97.5 70 16 176/94 (121) 96 Room Air  





 97.5       











Physical Exam


General:  Alert, Oriented X3, Cooperative, No acute distress


Heart:  Regular rate, Normal S1


Lungs:  Clear


Abdomen:  Normal bowel sounds, Soft


Extremities:  No clubbing, No cyanosis


Skin:  No significant lesion





Labs


LABS





Laboratory Tests








Test


 11/9/18


09:25


 


White Blood Count


 5.6 x10^3/uL


(4.0-11.0)


 


Red Blood Count


 4.07 x10^6/uL


(3.50-5.40)


 


Hemoglobin


 12.0 g/dL


(12.0-15.5)


 


Hematocrit


 37.5 %


(36.0-47.0)


 


Mean Corpuscular Volume 92 fL () 


 


Mean Corpuscular Hemoglobin 30 pg (25-35) 


 


Mean Corpuscular Hemoglobin


Concent 32 g/dL


(31-37)


 


Red Cell Distribution Width


 14.8 %


(11.5-14.5)


 


Platelet Count


 249 x10^3/uL


(140-400)


 


Neutrophils (%) (Auto) 55 % (31-73) 


 


Lymphocytes (%) (Auto) 29 % (24-48) 


 


Monocytes (%) (Auto) 6 % (0-9) 


 


Eosinophils (%) (Auto) 9 % (0-3) 


 


Basophils (%) (Auto) 1 % (0-3) 


 


Neutrophils # (Auto)


 3.1 x10^3uL


(1.8-7.7)


 


Lymphocytes # (Auto)


 1.6 x10^3/uL


(1.0-4.8)


 


Monocytes # (Auto)


 0.4 x10^3/uL


(0.0-1.1)


 


Eosinophils # (Auto)


 0.5 x10^3/uL


(0.0-0.7)


 


Basophils # (Auto)


 0.1 x10^3/uL


(0.0-0.2)


 


Sodium Level


 145 mmol/L


(136-145)


 


Potassium Level


 3.9 mmol/L


(3.5-5.1)


 


Chloride Level


 106 mmol/L


()


 


Carbon Dioxide Level


 33 mmol/L


(21-32)


 


Anion Gap 6 (6-14) 


 


Blood Urea Nitrogen


 14 mg/dL


(7-20)


 


Creatinine


 0.9 mg/dL


(0.6-1.0)


 


Estimated GFR


(Cockcroft-Gault) 79.6 





 


BUN/Creatinine Ratio 16 (6-20) 


 


Glucose Level


 72 mg/dL


(70-99)


 


Calcium Level


 9.3 mg/dL


(8.5-10.1)


 


Total Bilirubin


 0.2 mg/dL


(0.2-1.0)


 


Aspartate Amino Transf


(AST/SGOT) 16 U/L (15-37) 





 


Alanine Aminotransferase


(ALT/SGPT) 17 U/L (14-59) 





 


Alkaline Phosphatase


 75 U/L


()


 


Total Protein


 7.4 g/dL


(6.4-8.2)


 


Albumin


 3.2 g/dL


(3.4-5.0)


 


Albumin/Globulin Ratio 0.8 (1.0-1.7) 











Assessment and Plan


Assessmemt and Plan


Problems


Medical Problems:


(1) Syncope


Status: Acute  











Comment


Review of Relevant


I have reviewed the following items tomasz (where applicable) has been applied.


Labs





Laboratory Tests








Test


 11/8/18


03:05 11/9/18


09:25


 


White Blood Count


 5.3 x10^3/uL


(4.0-11.0) 5.6 x10^3/uL


(4.0-11.0)


 


Red Blood Count


 3.53 x10^6/uL


(3.50-5.40) 4.07 x10^6/uL


(3.50-5.40)


 


Hemoglobin


 10.7 g/dL


(12.0-15.5) 12.0 g/dL


(12.0-15.5)


 


Hematocrit


 32.4 %


(36.0-47.0) 37.5 %


(36.0-47.0)


 


Mean Corpuscular Volume 92 fL ()  92 fL () 


 


Mean Corpuscular Hemoglobin 30 pg (25-35)  30 pg (25-35) 


 


Mean Corpuscular Hemoglobin


Concent 33 g/dL


(31-37) 32 g/dL


(31-37)


 


Red Cell Distribution Width


 14.9 %


(11.5-14.5) 14.8 %


(11.5-14.5)


 


Platelet Count


 207 x10^3/uL


(140-400) 249 x10^3/uL


(140-400)


 


Neutrophils (%) (Auto) 41 % (31-73)  55 % (31-73) 


 


Lymphocytes (%) (Auto) 39 % (24-48)  29 % (24-48) 


 


Monocytes (%) (Auto) 9 % (0-9)  6 % (0-9) 


 


Eosinophils (%) (Auto) 11 % (0-3)  9 % (0-3) 


 


Basophils (%) (Auto) 1 % (0-3)  1 % (0-3) 


 


Neutrophils # (Auto)


 2.2 x10^3uL


(1.8-7.7) 3.1 x10^3uL


(1.8-7.7)


 


Lymphocytes # (Auto)


 2.1 x10^3/uL


(1.0-4.8) 1.6 x10^3/uL


(1.0-4.8)


 


Monocytes # (Auto)


 0.5 x10^3/uL


(0.0-1.1) 0.4 x10^3/uL


(0.0-1.1)


 


Eosinophils # (Auto)


 0.6 x10^3/uL


(0.0-0.7) 0.5 x10^3/uL


(0.0-0.7)


 


Basophils # (Auto)


 0.0 x10^3/uL


(0.0-0.2) 0.1 x10^3/uL


(0.0-0.2)


 


Sodium Level


 145 mmol/L


(136-145) 145 mmol/L


(136-145)


 


Potassium Level


 3.9 mmol/L


(3.5-5.1) 3.9 mmol/L


(3.5-5.1)


 


Chloride Level


 109 mmol/L


() 106 mmol/L


()


 


Carbon Dioxide Level


 30 mmol/L


(21-32) 33 mmol/L


(21-32)


 


Anion Gap 6 (6-14)  6 (6-14) 


 


Blood Urea Nitrogen


 14 mg/dL


(7-20) 14 mg/dL


(7-20)


 


Creatinine


 0.9 mg/dL


(0.6-1.0) 0.9 mg/dL


(0.6-1.0)


 


Estimated GFR


(Cockcroft-Gault) 79.6 


 79.6 





 


BUN/Creatinine Ratio 16 (6-20)  16 (6-20) 


 


Glucose Level


 111 mg/dL


(70-99) 72 mg/dL


(70-99)


 


Calcium Level


 8.8 mg/dL


(8.5-10.1) 9.3 mg/dL


(8.5-10.1)


 


Total Bilirubin


 0.1 mg/dL


(0.2-1.0) 0.2 mg/dL


(0.2-1.0)


 


Aspartate Amino Transf


(AST/SGOT) 22 U/L (15-37) 


 16 U/L (15-37) 





 


Alanine Aminotransferase


(ALT/SGPT) 15 U/L (14-59) 


 17 U/L (14-59) 





 


Alkaline Phosphatase


 72 U/L


() 75 U/L


()


 


Total Protein


 6.2 g/dL


(6.4-8.2) 7.4 g/dL


(6.4-8.2)


 


Albumin


 2.6 g/dL


(3.4-5.0) 3.2 g/dL


(3.4-5.0)


 


Albumin/Globulin Ratio 0.7 (1.0-1.7)  0.8 (1.0-1.7) 








Laboratory Tests








Test


 11/9/18


09:25


 


White Blood Count


 5.6 x10^3/uL


(4.0-11.0)


 


Red Blood Count


 4.07 x10^6/uL


(3.50-5.40)


 


Hemoglobin


 12.0 g/dL


(12.0-15.5)


 


Hematocrit


 37.5 %


(36.0-47.0)


 


Mean Corpuscular Volume 92 fL () 


 


Mean Corpuscular Hemoglobin 30 pg (25-35) 


 


Mean Corpuscular Hemoglobin


Concent 32 g/dL


(31-37)


 


Red Cell Distribution Width


 14.8 %


(11.5-14.5)


 


Platelet Count


 249 x10^3/uL


(140-400)


 


Neutrophils (%) (Auto) 55 % (31-73) 


 


Lymphocytes (%) (Auto) 29 % (24-48) 


 


Monocytes (%) (Auto) 6 % (0-9) 


 


Eosinophils (%) (Auto) 9 % (0-3) 


 


Basophils (%) (Auto) 1 % (0-3) 


 


Neutrophils # (Auto)


 3.1 x10^3uL


(1.8-7.7)


 


Lymphocytes # (Auto)


 1.6 x10^3/uL


(1.0-4.8)


 


Monocytes # (Auto)


 0.4 x10^3/uL


(0.0-1.1)


 


Eosinophils # (Auto)


 0.5 x10^3/uL


(0.0-0.7)


 


Basophils # (Auto)


 0.1 x10^3/uL


(0.0-0.2)


 


Sodium Level


 145 mmol/L


(136-145)


 


Potassium Level


 3.9 mmol/L


(3.5-5.1)


 


Chloride Level


 106 mmol/L


()


 


Carbon Dioxide Level


 33 mmol/L


(21-32)


 


Anion Gap 6 (6-14) 


 


Blood Urea Nitrogen


 14 mg/dL


(7-20)


 


Creatinine


 0.9 mg/dL


(0.6-1.0)


 


Estimated GFR


(Cockcroft-Gault) 79.6 





 


BUN/Creatinine Ratio 16 (6-20) 


 


Glucose Level


 72 mg/dL


(70-99)


 


Calcium Level


 9.3 mg/dL


(8.5-10.1)


 


Total Bilirubin


 0.2 mg/dL


(0.2-1.0)


 


Aspartate Amino Transf


(AST/SGOT) 16 U/L (15-37) 





 


Alanine Aminotransferase


(ALT/SGPT) 17 U/L (14-59) 





 


Alkaline Phosphatase


 75 U/L


()


 


Total Protein


 7.4 g/dL


(6.4-8.2)


 


Albumin


 3.2 g/dL


(3.4-5.0)


 


Albumin/Globulin Ratio 0.8 (1.0-1.7) 








Medications





Current Medications


Sodium Chloride 1,000 ml @  1,000 mls/hr 1X  ONCE IV  Last administered on 11/7/ 18at 00:46;  Start 11/6/18 at 23:00;  Stop 11/6/18 at 23:59;  Status DC


Aspirin (Shai Aspirin) 325 mg 1X  ONCE PO  Last administered on 11/7/18at 01:27

;  Start 11/7/18 at 01:15;  Stop 11/7/18 at 01:16;  Status DC


Ondansetron HCl (Zofran) 4 mg PRN Q8HRS  PRN IV NAUSEA/VOMITING;  Start 11/7/18 

at 01:15;  Stop 11/8/18 at 01:14;  Status DC


Lisinopril (Prinivil) 20 mg DAILY PO  Last administered on 11/9/18at 09:29;  

Start 11/7/18 at 14:00


Enoxaparin Sodium (Lovenox 40mg Syringe) 40 mg Q24H SQ  Last administered on 11/ 8/18at 13:35;  Start 11/7/18 at 14:00


Sodium Chloride 1,000 ml @  75 mls/hr [id number] IV  Last administered on 11/9/ 18at 09:28;  Start 11/7/18 at 13:45


Cephalexin HCl (Keflex) 500 mg QID PO  Last administered on 11/9/18at 09:28;  

Start 11/8/18 at 13:00


Lactobacillus Rhamnosus (Culturelle) 1 cap BID PO  Last administered on 11/9/ 18at 09:29;  Start 11/8/18 at 21:00





Active Scripts


Active


Reported


Lisinopril 20 Mg Tablet 1 Tab PO DAILY


Vitals/I & O





Vital Sign - Last 24 Hours








 11/8/18 11/8/18 11/8/18 11/8/18





 15:09 19:50 20:00 23:29


 


Temp 98.2 98.1  97.7





 98.2 98.1  97.7


 


Pulse 76 68  81


 


Resp 18 20  20


 


B/P (MAP) 158/103 (121) 150/77 (101)  126/78 (94)


 


Pulse Ox 99 98  100


 


O2 Delivery Room Air Room Air Room Air Room Air


 


    





    





 11/9/18 11/9/18 11/9/18 11/9/18





 03:19 07:00 09:29 11:00


 


Temp 97.9 95.7  97.5





 97.9 95.7  97.5


 


Pulse 72 62 62 70


 


Resp 20 16  16


 


B/P (MAP) 133/76 (95) 183/84 (117) 183/84 176/94 (121)


 


Pulse Ox 98 99  96


 


O2 Delivery Room Air Room Air  Room Air














Intake and Output   


 


 11/8/18 11/8/18 11/9/18





 15:00 23:00 07:00


 


Intake Total 768 ml 540 ml 900 ml


 


Balance 768 ml 540 ml 900 ml

















GHULAM MOYER MD Nov 9, 2018 12:38

## 2018-11-09 NOTE — PDOC3
Discharge Summary


Date of Admission:  Nov 7, 2018


Date of Discharge:  Nov 9, 2018


Follow-Up:  3-5 days


Admitting Diagnosis comment:





Assessment/Plan


Assessment/Plan


           Impression:  


            possible seizure, reports a seizure 15 yrs ago when pregnant, EEG 

OK NO EVIDENCE OF CURRENT SEIZURE


 Syncope


 mechanical fall


 tobacco abuse, EDUCATED TO CEASE


 morbid obesity


 no acute intracranial abnormality.  


            No acute fracture of the cervical spine.


            hx hypertension


            mild hypernatremia, volume depleted


            possible bipolar


            RECENT PUNCTURE wound to right plantar foot, has been on keflex PTA


 


 


 plan


 


 SEIZURE PRECAUTIONS D/C 


 EEG


  


 doppler carotids OK


 tele


 neurochecks q 4 hrs OK


 neurology FOLLOWING OK WITH D/C 


 ct head REVIEWED





Vitals


Vitals





Vital Signs








  Date Time  Temp Pulse Resp B/P (MAP) Pulse Ox O2 Delivery O2 Flow Rate FiO2


 


11/9/18 11:00 97.5 70 16 176/94 (121) 96 Room Air  





 97.5       











Physical Exam


General:  Alert, Oriented X3, Cooperative, No acute distress


Heart:  Regular rate, Normal S1


Lungs:  Clear


Abdomen:  Normal bowel sounds, Soft


Extremities:  No clubbing, No cyanosis


Skin:  No significant lesion





Labs


FINAL DIAGNOSIS


Problems


Medical Problems:


(1) Syncope


Status: Acute  








Brief Hospital Course


Ms. Florez  is a 53 old [sex] who presented with [ SYNCOPE]


CONDITION AT DISCHARGE:  Improved


Discharge Medications





Current Medications


Sodium Chloride 1,000 ml @  1,000 mls/hr 1X  ONCE IV  Last administered on 11/7/ 18at 00:46;  Start 11/6/18 at 23:00;  Stop 11/6/18 at 23:59;  Status DC


Aspirin (Shai Aspirin) 325 mg 1X  ONCE PO  Last administered on 11/7/18at 01:27

;  Start 11/7/18 at 01:15;  Stop 11/7/18 at 01:16;  Status DC


Ondansetron HCl (Zofran) 4 mg PRN Q8HRS  PRN IV NAUSEA/VOMITING;  Start 11/7/18 

at 01:15;  Stop 11/8/18 at 01:14;  Status DC


Lisinopril (Prinivil) 20 mg DAILY PO  Last administered on 11/9/18at 09:29;  

Start 11/7/18 at 14:00


Enoxaparin Sodium (Lovenox 40mg Syringe) 40 mg Q24H SQ  Last administered on 11/ 8/18at 13:35;  Start 11/7/18 at 14:00


Sodium Chloride 1,000 ml @  75 mls/hr [id number] IV  Last administered on 11/9/ 18at 09:28;  Start 11/7/18 at 13:45


Cephalexin HCl (Keflex) 500 mg QID PO  Last administered on 11/9/18at 09:28;  

Start 11/8/18 at 13:00


Lactobacillus Rhamnosus (Culturelle) 1 cap BID PO  Last administered on 11/9/ 18at 09:29;  Start 11/8/18 at 21:00





Active Scripts


Active


Reported


Lisinopril 20 Mg Tablet 1 Tab PO DAILY


Vital Signs





Vital Signs








  Date Time  Temp Pulse Resp B/P (MAP) Pulse Ox O2 Delivery O2 Flow Rate FiO2


 


11/9/18 11:00 97.5 70 16 176/94 (121) 96 Room Air  





 97.5       








Labs





Laboratory Tests








Test


 11/8/18


03:05 11/9/18


09:25


 


White Blood Count


 5.3 x10^3/uL


(4.0-11.0) 5.6 x10^3/uL


(4.0-11.0)


 


Red Blood Count


 3.53 x10^6/uL


(3.50-5.40) 4.07 x10^6/uL


(3.50-5.40)


 


Hemoglobin


 10.7 g/dL


(12.0-15.5) 12.0 g/dL


(12.0-15.5)


 


Hematocrit


 32.4 %


(36.0-47.0) 37.5 %


(36.0-47.0)


 


Mean Corpuscular Volume 92 fL ()  92 fL () 


 


Mean Corpuscular Hemoglobin 30 pg (25-35)  30 pg (25-35) 


 


Mean Corpuscular Hemoglobin


Concent 33 g/dL


(31-37) 32 g/dL


(31-37)


 


Red Cell Distribution Width


 14.9 %


(11.5-14.5) 14.8 %


(11.5-14.5)


 


Platelet Count


 207 x10^3/uL


(140-400) 249 x10^3/uL


(140-400)


 


Neutrophils (%) (Auto) 41 % (31-73)  55 % (31-73) 


 


Lymphocytes (%) (Auto) 39 % (24-48)  29 % (24-48) 


 


Monocytes (%) (Auto) 9 % (0-9)  6 % (0-9) 


 


Eosinophils (%) (Auto) 11 % (0-3)  9 % (0-3) 


 


Basophils (%) (Auto) 1 % (0-3)  1 % (0-3) 


 


Neutrophils # (Auto)


 2.2 x10^3uL


(1.8-7.7) 3.1 x10^3uL


(1.8-7.7)


 


Lymphocytes # (Auto)


 2.1 x10^3/uL


(1.0-4.8) 1.6 x10^3/uL


(1.0-4.8)


 


Monocytes # (Auto)


 0.5 x10^3/uL


(0.0-1.1) 0.4 x10^3/uL


(0.0-1.1)


 


Eosinophils # (Auto)


 0.6 x10^3/uL


(0.0-0.7) 0.5 x10^3/uL


(0.0-0.7)


 


Basophils # (Auto)


 0.0 x10^3/uL


(0.0-0.2) 0.1 x10^3/uL


(0.0-0.2)


 


Sodium Level


 145 mmol/L


(136-145) 145 mmol/L


(136-145)


 


Potassium Level


 3.9 mmol/L


(3.5-5.1) 3.9 mmol/L


(3.5-5.1)


 


Chloride Level


 109 mmol/L


() 106 mmol/L


()


 


Carbon Dioxide Level


 30 mmol/L


(21-32) 33 mmol/L


(21-32)


 


Anion Gap 6 (6-14)  6 (6-14) 


 


Blood Urea Nitrogen


 14 mg/dL


(7-20) 14 mg/dL


(7-20)


 


Creatinine


 0.9 mg/dL


(0.6-1.0) 0.9 mg/dL


(0.6-1.0)


 


Estimated GFR


(Cockcroft-Gault) 79.6 


 79.6 





 


BUN/Creatinine Ratio 16 (6-20)  16 (6-20) 


 


Glucose Level


 111 mg/dL


(70-99) 72 mg/dL


(70-99)


 


Calcium Level


 8.8 mg/dL


(8.5-10.1) 9.3 mg/dL


(8.5-10.1)


 


Total Bilirubin


 0.1 mg/dL


(0.2-1.0) 0.2 mg/dL


(0.2-1.0)


 


Aspartate Amino Transf


(AST/SGOT) 22 U/L (15-37) 


 16 U/L (15-37) 





 


Alanine Aminotransferase


(ALT/SGPT) 15 U/L (14-59) 


 17 U/L (14-59) 





 


Alkaline Phosphatase


 72 U/L


() 75 U/L


()


 


Total Protein


 6.2 g/dL


(6.4-8.2) 7.4 g/dL


(6.4-8.2)


 


Albumin


 2.6 g/dL


(3.4-5.0) 3.2 g/dL


(3.4-5.0)


 


Albumin/Globulin Ratio 0.7 (1.0-1.7)  0.8 (1.0-1.7) 








Laboratory Tests








Test


 11/9/18


09:25


 


White Blood Count


 5.6 x10^3/uL


(4.0-11.0)


 


Red Blood Count


 4.07 x10^6/uL


(3.50-5.40)


 


Hemoglobin


 12.0 g/dL


(12.0-15.5)


 


Hematocrit


 37.5 %


(36.0-47.0)


 


Mean Corpuscular Volume 92 fL () 


 


Mean Corpuscular Hemoglobin 30 pg (25-35) 


 


Mean Corpuscular Hemoglobin


Concent 32 g/dL


(31-37)


 


Red Cell Distribution Width


 14.8 %


(11.5-14.5)


 


Platelet Count


 249 x10^3/uL


(140-400)


 


Neutrophils (%) (Auto) 55 % (31-73) 


 


Lymphocytes (%) (Auto) 29 % (24-48) 


 


Monocytes (%) (Auto) 6 % (0-9) 


 


Eosinophils (%) (Auto) 9 % (0-3) 


 


Basophils (%) (Auto) 1 % (0-3) 


 


Neutrophils # (Auto)


 3.1 x10^3uL


(1.8-7.7)


 


Lymphocytes # (Auto)


 1.6 x10^3/uL


(1.0-4.8)


 


Monocytes # (Auto)


 0.4 x10^3/uL


(0.0-1.1)


 


Eosinophils # (Auto)


 0.5 x10^3/uL


(0.0-0.7)


 


Basophils # (Auto)


 0.1 x10^3/uL


(0.0-0.2)


 


Sodium Level


 145 mmol/L


(136-145)


 


Potassium Level


 3.9 mmol/L


(3.5-5.1)


 


Chloride Level


 106 mmol/L


()


 


Carbon Dioxide Level


 33 mmol/L


(21-32)


 


Anion Gap 6 (6-14) 


 


Blood Urea Nitrogen


 14 mg/dL


(7-20)


 


Creatinine


 0.9 mg/dL


(0.6-1.0)


 


Estimated GFR


(Cockcroft-Gault) 79.6 





 


BUN/Creatinine Ratio 16 (6-20) 


 


Glucose Level


 72 mg/dL


(70-99)


 


Calcium Level


 9.3 mg/dL


(8.5-10.1)


 


Total Bilirubin


 0.2 mg/dL


(0.2-1.0)


 


Aspartate Amino Transf


(AST/SGOT) 16 U/L (15-37) 





 


Alanine Aminotransferase


(ALT/SGPT) 17 U/L (14-59) 





 


Alkaline Phosphatase


 75 U/L


()


 


Total Protein


 7.4 g/dL


(6.4-8.2)


 


Albumin


 3.2 g/dL


(3.4-5.0)


 


Albumin/Globulin Ratio 0.8 (1.0-1.7) 








Allergies





 Allergies








Coded Allergies Type Severity Reaction Last Updated Verified


 


  codeine Allergy Mild  11/6/18 Yes








Disposition/Orders:  D/C to Home


Patient Instructions


D/C SIGRID 35 MIN











GHULAM MOYER MD Nov 9, 2018 12:39

## 2018-11-09 NOTE — PDOC
PROGRESS NOTES


Assessment


Assessment


Syncopal spell.


Seizure evaluation, no evidence of seizure this time.


Fall.


HTN.


Emotional stress.


Smoking.


Obesity.





RECOMMENDATIONS/PLAN:


Treat medical diseases.


Weight reduction.


FU with PCP.





EEG on 11/8/18: Normal.


HCT and CCT: Negative.





HISTORY OF THE PRESENT ILLNESS:


This is a 53 -year-old AA female patient was brought to the ER of [facility] by EMS. 

The patient stated that [place] 

then fell off her seat, hitting her head. The  called ambulance so 

she was brought here. She stays at shelter homes and cousin's house. When asked 

about her mood, she says she feels "betrayed" by her fiance. She denies 

suicidal and homicidal ideations. She stated she had a lot of emotional stress.





Past Medical History


Asthma, Hypertension, a seizure 15 yrs ago when pregnant


Pulmonary:  Bronchitis





Past Surgical History


Hysterectomy, bladder surgery





Family History


HLD, Hypertension. 





Allergies


Coded Allergies:  


codeine (Verified  Allergy, Mild, 11/6/18)





MEDICATIONS:


Refer to [facility]





SOCIAL HISTORY: 


Lives with her cousin. alone. 


Denies illicit drug use. 


She smokes 1  pack of cigarettes a day for years.  


She drinks occasionally.





REVIEW OF SYSTEMS:


Constitutional: Obesity.


Head: No traumatic brain or head injury.


Skin: No edema, or rash.


Ear: No infection.


Eyes: No vision loss or color blindness.


Nose: No bleeding or purulent discharges.


Hearing: No hearing decrease.


Neck: No injury.


Breast: No history of cancer, masses,or discharges.


Cardiac: HTN.


Pulmonary: Asthma.


GI: No GI ulcer, GI bleeding.


Urinary/genital: UTI.


Endocrinologic: Obesity.


Skeletomuscular: No muscular atrophy, deformity.


Neurological: see  HP.


Psychiatric: Denies drug use/abuse.


Otherwise, not pertinant14-point review of systems.





PHYSICAL EXAMINATION:   


General appearance is in no acute distress.  


HEENT:  Normocephalic and nontraumatic.  Eyes, nose, ears, and throat are 

unremarkable.  


Neck is supple. No lymphadenopathy. No bruits are heard over the carotid 

artery.  No crepitus. 


Cardiovascular:  S1, S2, regular rate and rhythm.  


Pulmonary:  Clear to auscultation bilaterally. 


Abdomen:  Bowel sounds are positive.  Abdomen is soft, nontender, and 

nondistended.    


Extremities:  No rash, lesions, or edema.  


No restriction of range of motion





NEUROLOGICAL  EXAMINATION:


Alert 


Oriented to time, place and person.


PERRL.


EOMI.


CN: no focal findings.


Muscle tone: within normal.


Muscle strength: 5


DTR: 2


Plantar reflex: Flexor response bilaterally 


Gait: Normal.


Sensory exam: no abnormal findings.


No cerebellar signs elicited.


F-T-N test accurate.





Objective


Objective





Vital Signs








  Date Time  Temp Pulse Resp B/P (MAP) Pulse Ox O2 Delivery O2 Flow Rate FiO2


 


11/9/18 11:00 97.5 70 16 176/94 (121) 96 Room Air  





 97.5       














Intake and Output 


 


 11/9/18





 07:00


 


Intake Total 2208 ml


 


Balance 2208 ml


 


 


 


Intake Oral 2208 ml


 


# Voids 9











Vitals Signs


Vitals





 VS - Last 72 Hours, by Label








  Date Time  Temp Pulse Resp B/P (MAP) Pulse Ox O2 Delivery O2 Flow Rate FiO2


 


11/9/18 11:00 97.5 70 16 176/94 (121) 96 Room Air  





 97.5       


 


11/9/18 09:29  62  183/84    


 


11/9/18 08:00      Room Air  


 


11/9/18 07:00 95.7 62 16 183/84 (117) 99 Room Air  





 95.7       


 


11/9/18 03:19 97.9 72 20 133/76 (95) 98 Room Air  





 97.9       


 


11/8/18 23:29 97.7 81 20 126/78 (94) 100 Room Air  





 97.7       


 


11/8/18 20:00      Room Air  


 


11/8/18 19:50 98.1 68 20 150/77 (101) 98 Room Air  





 98.1       


 


11/8/18 15:09 98.2 76 18 158/103 (121) 99 Room Air  





 98.2       


 


11/8/18 11:41 99.1 73 18 156/87 (110) 99 Room Air  





 99.1       


 


11/8/18 09:38  89  155/94 (114) 100 Room Air  


 


11/8/18 08:26  72  181/94    


 


11/8/18 08:00      Room Air  


 


11/8/18 07:55 97.7 72 20 181/94 (123) 99 Room Air  





 97.7       











Laboratory


Laboratory





Laboratory Tests








Test


 11/9/18


09:25


 


White Blood Count


 5.6 x10^3/uL


(4.0-11.0)


 


Red Blood Count


 4.07 x10^6/uL


(3.50-5.40)


 


Hemoglobin


 12.0 g/dL


(12.0-15.5)


 


Hematocrit


 37.5 %


(36.0-47.0)


 


Mean Corpuscular Volume 92 fL () 


 


Mean Corpuscular Hemoglobin 30 pg (25-35) 


 


Mean Corpuscular Hemoglobin


Concent 32 g/dL


(31-37)


 


Red Cell Distribution Width


 14.8 %


(11.5-14.5)


 


Platelet Count


 249 x10^3/uL


(140-400)


 


Neutrophils (%) (Auto) 55 % (31-73) 


 


Lymphocytes (%) (Auto) 29 % (24-48) 


 


Monocytes (%) (Auto) 6 % (0-9) 


 


Eosinophils (%) (Auto) 9 % (0-3) 


 


Basophils (%) (Auto) 1 % (0-3) 


 


Neutrophils # (Auto)


 3.1 x10^3uL


(1.8-7.7)


 


Lymphocytes # (Auto)


 1.6 x10^3/uL


(1.0-4.8)


 


Monocytes # (Auto)


 0.4 x10^3/uL


(0.0-1.1)


 


Eosinophils # (Auto)


 0.5 x10^3/uL


(0.0-0.7)


 


Basophils # (Auto)


 0.1 x10^3/uL


(0.0-0.2)


 


Sodium Level


 145 mmol/L


(136-145)


 


Potassium Level


 3.9 mmol/L


(3.5-5.1)


 


Chloride Level


 106 mmol/L


()


 


Carbon Dioxide Level


 33 mmol/L


(21-32)


 


Anion Gap 6 (6-14) 


 


Blood Urea Nitrogen


 14 mg/dL


(7-20)


 


Creatinine


 0.9 mg/dL


(0.6-1.0)


 


Estimated GFR


(Cockcroft-Gault) 79.6 





 


BUN/Creatinine Ratio 16 (6-20) 


 


Glucose Level


 72 mg/dL


(70-99)


 


Calcium Level


 9.3 mg/dL


(8.5-10.1)


 


Total Bilirubin


 0.2 mg/dL


(0.2-1.0)


 


Aspartate Amino Transf


(AST/SGOT) 16 U/L (15-37) 





 


Alanine Aminotransferase


(ALT/SGPT) 17 U/L (14-59) 





 


Alkaline Phosphatase


 75 U/L


()


 


Total Protein


 7.4 g/dL


(6.4-8.2)


 


Albumin


 3.2 g/dL


(3.4-5.0)


 


Albumin/Globulin Ratio 0.8 (1.0-1.7) 











Medication


Medications





Current Medications


Lactobacillus Rhamnosus (Culturelle) 1 cap BID PO  Last administered on 11/9/ 18at 09:29;  Start 11/8/18 at 21:00


Lisinopril (Prinivil) 20 mg BID66 PO ;  Start 11/9/18 at 18:00





Comment


Review of Relevant


I have reviewed the following items tomasz (where applicable) has been applied.











SOHAIL BYRNE MD Nov 9, 2018 14:19